# Patient Record
Sex: MALE | Race: WHITE | NOT HISPANIC OR LATINO
[De-identification: names, ages, dates, MRNs, and addresses within clinical notes are randomized per-mention and may not be internally consistent; named-entity substitution may affect disease eponyms.]

---

## 2018-02-16 ENCOUNTER — RESULT REVIEW (OUTPATIENT)
Age: 58
End: 2018-02-16

## 2019-05-20 ENCOUNTER — RECORD ABSTRACTING (OUTPATIENT)
Age: 59
End: 2019-05-20

## 2019-05-20 DIAGNOSIS — Z87.438 PERSONAL HISTORY OF OTHER DISEASES OF MALE GENITAL ORGANS: ICD-10-CM

## 2019-05-20 DIAGNOSIS — Z86.79 PERSONAL HISTORY OF OTHER DISEASES OF THE CIRCULATORY SYSTEM: ICD-10-CM

## 2019-05-20 DIAGNOSIS — Z12.11 ENCOUNTER FOR SCREENING FOR MALIGNANT NEOPLASM OF COLON: ICD-10-CM

## 2019-05-20 DIAGNOSIS — Z87.19 PERSONAL HISTORY OF OTHER DISEASES OF THE DIGESTIVE SYSTEM: ICD-10-CM

## 2019-08-13 ENCOUNTER — APPOINTMENT (OUTPATIENT)
Dept: FAMILY MEDICINE | Facility: CLINIC | Age: 59
End: 2019-08-13
Payer: MEDICAID

## 2019-08-13 VITALS
HEIGHT: 71 IN | BODY MASS INDEX: 34.44 KG/M2 | DIASTOLIC BLOOD PRESSURE: 80 MMHG | HEART RATE: 70 BPM | OXYGEN SATURATION: 97 % | WEIGHT: 246 LBS | TEMPERATURE: 98.3 F | SYSTOLIC BLOOD PRESSURE: 128 MMHG

## 2019-08-13 PROCEDURE — 99213 OFFICE O/P EST LOW 20 MIN: CPT | Mod: 25

## 2019-08-13 PROCEDURE — 36415 COLL VENOUS BLD VENIPUNCTURE: CPT

## 2019-08-13 NOTE — ASSESSMENT
[FreeTextEntry1] : Hypertension hyperlipidemia diabetes type 2, saw cardiology in May 2019, echocardiogram was done, nuclear stress test is scheduled for November 2019 with Dr. BEST

## 2019-08-13 NOTE — HISTORY OF PRESENT ILLNESS
[FreeTextEntry1] : pt is here for med refills and possible b/w  [de-identified] : A 59-year-old male history of diabetes type 2, hypertension and hyperlipidemia, here for his three-month check

## 2019-08-13 NOTE — PHYSICAL EXAM
[Well Nourished] : well nourished [No Acute Distress] : no acute distress [Well-Appearing] : well-appearing [Well Developed] : well developed [Normal Sclera/Conjunctiva] : normal sclera/conjunctiva [PERRL] : pupils equal round and reactive to light [EOMI] : extraocular movements intact [Normal Oropharynx] : the oropharynx was normal [Normal Outer Ear/Nose] : the outer ears and nose were normal in appearance [No JVD] : no jugular venous distention [No Lymphadenopathy] : no lymphadenopathy [Thyroid Normal, No Nodules] : the thyroid was normal and there were no nodules present [Supple] : supple [No Accessory Muscle Use] : no accessory muscle use [No Respiratory Distress] : no respiratory distress  [Clear to Auscultation] : lungs were clear to auscultation bilaterally [Normal Rate] : normal rate  [Regular Rhythm] : with a regular rhythm [Normal S1, S2] : normal S1 and S2 [No Murmur] : no murmur heard [No Abdominal Bruit] : a ~M bruit was not heard ~T in the abdomen [No Carotid Bruits] : no carotid bruits [No Varicosities] : no varicosities [Pedal Pulses Present] : the pedal pulses are present [No Edema] : there was no peripheral edema [No Extremity Clubbing/Cyanosis] : no extremity clubbing/cyanosis [No Palpable Aorta] : no palpable aorta [Soft] : abdomen soft [Non Tender] : non-tender [Non-distended] : non-distended [No Masses] : no abdominal mass palpated [No HSM] : no HSM [Normal Posterior Cervical Nodes] : no posterior cervical lymphadenopathy [Normal Bowel Sounds] : normal bowel sounds [Normal Anterior Cervical Nodes] : no anterior cervical lymphadenopathy [No CVA Tenderness] : no CVA  tenderness [No Spinal Tenderness] : no spinal tenderness [No Joint Swelling] : no joint swelling [Grossly Normal Strength/Tone] : grossly normal strength/tone [No Rash] : no rash [No Focal Deficits] : no focal deficits [Coordination Grossly Intact] : coordination grossly intact [Normal Gait] : normal gait [Deep Tendon Reflexes (DTR)] : deep tendon reflexes were 2+ and symmetric [Normal Affect] : the affect was normal [Normal Insight/Judgement] : insight and judgment were intact

## 2019-08-16 LAB
ALBUMIN SERPL ELPH-MCNC: 4.5 G/DL
ALP BLD-CCNC: 84 U/L
ALT SERPL-CCNC: 36 U/L
ANION GAP SERPL CALC-SCNC: 15 MMOL/L
AST SERPL-CCNC: 18 U/L
BILIRUB DIRECT SERPL-MCNC: 0.2 MG/DL
BILIRUB INDIRECT SERPL-MCNC: 0.3 MG/DL
BILIRUB SERPL-MCNC: 0.4 MG/DL
BUN SERPL-MCNC: 13 MG/DL
CALCIUM SERPL-MCNC: 9.5 MG/DL
CHLORIDE SERPL-SCNC: 103 MMOL/L
CHOLEST SERPL-MCNC: 149 MG/DL
CHOLEST/HDLC SERPL: 3.7 RATIO
CO2 SERPL-SCNC: 24 MMOL/L
CREAT SERPL-MCNC: 0.81 MG/DL
ESTIMATED AVERAGE GLUCOSE: 174 MG/DL
GLUCOSE SERPL-MCNC: 127 MG/DL
HBA1C MFR BLD HPLC: 7.7 %
HDLC SERPL-MCNC: 40 MG/DL
LDLC SERPL CALC-MCNC: 86 MG/DL
POTASSIUM SERPL-SCNC: 3.9 MMOL/L
PROT SERPL-MCNC: 6.5 G/DL
SODIUM SERPL-SCNC: 142 MMOL/L
TRIGL SERPL-MCNC: 114 MG/DL

## 2019-11-11 ENCOUNTER — RESULT CHARGE (OUTPATIENT)
Age: 59
End: 2019-11-11

## 2019-11-12 ENCOUNTER — NON-APPOINTMENT (OUTPATIENT)
Age: 59
End: 2019-11-12

## 2019-11-12 ENCOUNTER — APPOINTMENT (OUTPATIENT)
Dept: FAMILY MEDICINE | Facility: CLINIC | Age: 59
End: 2019-11-12
Payer: MEDICAID

## 2019-11-12 ENCOUNTER — MED ADMIN CHARGE (OUTPATIENT)
Age: 59
End: 2019-11-12

## 2019-11-12 VITALS
SYSTOLIC BLOOD PRESSURE: 124 MMHG | OXYGEN SATURATION: 97 % | TEMPERATURE: 98.4 F | BODY MASS INDEX: 33.93 KG/M2 | DIASTOLIC BLOOD PRESSURE: 80 MMHG | WEIGHT: 242.38 LBS | HEIGHT: 71 IN | HEART RATE: 70 BPM

## 2019-11-12 DIAGNOSIS — Z87.891 PERSONAL HISTORY OF NICOTINE DEPENDENCE: ICD-10-CM

## 2019-11-12 DIAGNOSIS — N52.9 MALE ERECTILE DYSFUNCTION, UNSPECIFIED: ICD-10-CM

## 2019-11-12 DIAGNOSIS — K43.9 VENTRAL HERNIA W/OUT OBSTRUCTION OR GANGRENE: ICD-10-CM

## 2019-11-12 LAB
BILIRUB UR QL STRIP: NORMAL
CLARITY UR: CLEAR
COLLECTION METHOD: NORMAL
GLUCOSE UR-MCNC: 500
HCG UR QL: 0.2 EU/DL
HGB UR QL STRIP.AUTO: NORMAL
KETONES UR-MCNC: NORMAL
LEUKOCYTE ESTERASE UR QL STRIP: NORMAL
NITRITE UR QL STRIP: NORMAL
PH UR STRIP: 5
PROT UR STRIP-MCNC: NORMAL
SP GR UR STRIP: 1.01

## 2019-11-12 PROCEDURE — 90686 IIV4 VACC NO PRSV 0.5 ML IM: CPT

## 2019-11-12 PROCEDURE — 36415 COLL VENOUS BLD VENIPUNCTURE: CPT

## 2019-11-12 PROCEDURE — 99396 PREV VISIT EST AGE 40-64: CPT | Mod: 25

## 2019-11-12 PROCEDURE — 90471 IMMUNIZATION ADMIN: CPT

## 2019-11-12 PROCEDURE — 93000 ELECTROCARDIOGRAM COMPLETE: CPT

## 2019-11-12 PROCEDURE — 81003 URINALYSIS AUTO W/O SCOPE: CPT | Mod: QW

## 2019-11-12 RX ORDER — VARENICLINE TARTRATE 1 MG/1
1 TABLET, FILM COATED ORAL
Refills: 0 | Status: DISCONTINUED | COMMUNITY
End: 2019-11-12

## 2019-11-12 NOTE — HEALTH RISK ASSESSMENT
[11-15] : 11-15 [No] : No [Patient reported colonoscopy was normal] : Patient reported colonoscopy was normal [With Family] : lives with family [Employed] : employed [College] : College [] :  [Sexually Active] : sexually active [] : No [YearQuit] : 1993 [Change in mental status noted] : No change in mental status noted [High Risk Behavior] : no high risk behavior [Reports changes in hearing] : Reports no changes in hearing [Reports changes in vision] : Reports no changes in vision [ColonoscopyDate] : 10/17 [Reports changes in dental health] : Reports no changes in dental health [HepatitisCDate] : 09/18 [HepatitisCComments] : negative

## 2019-11-12 NOTE — HISTORY OF PRESENT ILLNESS
[de-identified] : this is  60 y/o male with PMH htn, hlp, DM2, insulin dependent, reports feeling well with no complaints. here for wellness check , flu vaccine, and medication renewal. PMH also includes neuropathy in b/l feet, and right foot neuroma.

## 2019-11-12 NOTE — HEALTH RISK ASSESSMENT
[No] : No [11-15] : 11-15 [Patient reported colonoscopy was normal] : Patient reported colonoscopy was normal [With Family] : lives with family [Employed] : employed [College] : College [] :  [Sexually Active] : sexually active [] : No [YearQuit] : 1993 [Change in mental status noted] : No change in mental status noted [Reports changes in hearing] : Reports no changes in hearing [High Risk Behavior] : no high risk behavior [Reports changes in vision] : Reports no changes in vision [Reports changes in dental health] : Reports no changes in dental health [ColonoscopyDate] : 10/17 [HepatitisCDate] : 09/18 [HepatitisCComments] : negative

## 2019-11-12 NOTE — PHYSICAL EXAM
[Normal Sclera/Conjunctiva] : normal sclera/conjunctiva [Normal Outer Ear/Nose] : the outer ears and nose were normal in appearance [No Lymphadenopathy] : no lymphadenopathy [Supple] : supple [No Extremity Clubbing/Cyanosis] : no extremity clubbing/cyanosis [No Edema] : there was no peripheral edema [Soft] : abdomen soft [Non-distended] : non-distended [Normal Supraclavicular Nodes] : no supraclavicular lymphadenopathy [Normal Posterior Cervical Nodes] : no posterior cervical lymphadenopathy [Normal Anterior Cervical Nodes] : no anterior cervical lymphadenopathy [No Spinal Tenderness] : no spinal tenderness [No Skin Lesions] : no skin lesions [Normal] : affect was normal and insight and judgment were intact [No Focal Deficits] : no focal deficits [Acne] : no acne [de-identified] : ventral hernia, tenderness felt on palpation at xiphoid process.

## 2019-11-12 NOTE — HISTORY OF PRESENT ILLNESS
[de-identified] : this is  60 y/o male with PMH htn, hlp, DM2, insulin dependent, reports feeling well with no complaints. here for wellness check , flu vaccine, and medication renewal. PMH also includes neuropathy in b/l feet, and right foot neuroma.

## 2019-11-12 NOTE — PHYSICAL EXAM
[Normal Sclera/Conjunctiva] : normal sclera/conjunctiva [No Lymphadenopathy] : no lymphadenopathy [Normal Outer Ear/Nose] : the outer ears and nose were normal in appearance [Supple] : supple [No Extremity Clubbing/Cyanosis] : no extremity clubbing/cyanosis [No Edema] : there was no peripheral edema [Soft] : abdomen soft [Non-distended] : non-distended [Normal Posterior Cervical Nodes] : no posterior cervical lymphadenopathy [Normal Supraclavicular Nodes] : no supraclavicular lymphadenopathy [Normal Anterior Cervical Nodes] : no anterior cervical lymphadenopathy [No Spinal Tenderness] : no spinal tenderness [No Skin Lesions] : no skin lesions [Normal] : affect was normal and insight and judgment were intact [No Focal Deficits] : no focal deficits [Acne] : no acne [de-identified] : ventral hernia, tenderness felt on palpation at xiphoid process.

## 2019-11-21 LAB
25(OH)D3 SERPL-MCNC: 29.4 NG/ML
ALBUMIN SERPL ELPH-MCNC: 4.9 G/DL
ALP BLD-CCNC: 94 U/L
ALT SERPL-CCNC: 34 U/L
ANION GAP SERPL CALC-SCNC: 16 MMOL/L
APPEARANCE: CLEAR
AST SERPL-CCNC: 21 U/L
BACTERIA: NEGATIVE
BASOPHILS # BLD AUTO: 0.08 K/UL
BASOPHILS NFR BLD AUTO: 0.9 %
BILIRUB SERPL-MCNC: 0.5 MG/DL
BILIRUBIN URINE: NEGATIVE
BLOOD URINE: NEGATIVE
BUN SERPL-MCNC: 10 MG/DL
CALCIUM SERPL-MCNC: 9.8 MG/DL
CHLORIDE SERPL-SCNC: 102 MMOL/L
CHOLEST SERPL-MCNC: 162 MG/DL
CHOLEST/HDLC SERPL: 3.6 RATIO
CO2 SERPL-SCNC: 23 MMOL/L
COLOR: NORMAL
CREAT SERPL-MCNC: 0.86 MG/DL
EOSINOPHIL # BLD AUTO: 0.24 K/UL
EOSINOPHIL NFR BLD AUTO: 2.7 %
ESTIMATED AVERAGE GLUCOSE: 151 MG/DL
FOLATE SERPL-MCNC: 16 NG/ML
GLUCOSE QUALITATIVE U: ABNORMAL
GLUCOSE SERPL-MCNC: 135 MG/DL
HBA1C MFR BLD HPLC: 6.9 %
HCT VFR BLD CALC: 50.5 %
HDLC SERPL-MCNC: 45 MG/DL
HGB BLD-MCNC: 16.7 G/DL
HYALINE CASTS: 0 /LPF
IMM GRANULOCYTES NFR BLD AUTO: 1 %
IRON SATN MFR SERPL: 28 %
IRON SERPL-MCNC: 97 UG/DL
KETONES URINE: NEGATIVE
LDLC SERPL CALC-MCNC: 88 MG/DL
LEUKOCYTE ESTERASE URINE: NEGATIVE
LYMPHOCYTES # BLD AUTO: 1.9 K/UL
LYMPHOCYTES NFR BLD AUTO: 21.4 %
MAN DIFF?: NORMAL
MCHC RBC-ENTMCNC: 30.5 PG
MCHC RBC-ENTMCNC: 33.1 GM/DL
MCV RBC AUTO: 92.3 FL
MICROSCOPIC-UA: NORMAL
MONOCYTES # BLD AUTO: 0.87 K/UL
MONOCYTES NFR BLD AUTO: 9.8 %
NEUTROPHILS # BLD AUTO: 5.71 K/UL
NEUTROPHILS NFR BLD AUTO: 64.2 %
NITRITE URINE: NEGATIVE
PH URINE: 5
PLATELET # BLD AUTO: 388 K/UL
POTASSIUM SERPL-SCNC: 4.5 MMOL/L
PROT SERPL-MCNC: 7.1 G/DL
PROTEIN URINE: NEGATIVE
PSA SERPL-MCNC: 6.39 NG/ML
RBC # BLD: 5.47 M/UL
RBC # FLD: 12.7 %
RED BLOOD CELLS URINE: 1 /HPF
SODIUM SERPL-SCNC: 141 MMOL/L
SPECIFIC GRAVITY URINE: 1.03
SQUAMOUS EPITHELIAL CELLS: 0 /HPF
T4 FREE SERPL-MCNC: 1.2 NG/DL
TIBC SERPL-MCNC: 346 UG/DL
TRIGL SERPL-MCNC: 143 MG/DL
TSH SERPL-ACNC: 1.38 UIU/ML
UIBC SERPL-MCNC: 249 UG/DL
UROBILINOGEN URINE: NORMAL
VIT B12 SERPL-MCNC: 1074 PG/ML
WBC # FLD AUTO: 8.89 K/UL
WHITE BLOOD CELLS URINE: 0 /HPF

## 2020-02-11 ENCOUNTER — APPOINTMENT (OUTPATIENT)
Dept: FAMILY MEDICINE | Facility: CLINIC | Age: 60
End: 2020-02-11

## 2020-06-03 ENCOUNTER — RX RENEWAL (OUTPATIENT)
Age: 60
End: 2020-06-03

## 2020-08-18 ENCOUNTER — APPOINTMENT (OUTPATIENT)
Dept: FAMILY MEDICINE | Facility: CLINIC | Age: 60
End: 2020-08-18
Payer: MEDICAID

## 2020-08-18 VITALS
OXYGEN SATURATION: 96 % | WEIGHT: 238.8 LBS | SYSTOLIC BLOOD PRESSURE: 120 MMHG | BODY MASS INDEX: 33.31 KG/M2 | DIASTOLIC BLOOD PRESSURE: 72 MMHG | HEART RATE: 65 BPM | TEMPERATURE: 98.1 F

## 2020-08-18 PROCEDURE — 36415 COLL VENOUS BLD VENIPUNCTURE: CPT

## 2020-08-18 PROCEDURE — 99213 OFFICE O/P EST LOW 20 MIN: CPT | Mod: 25

## 2020-08-18 RX ORDER — SILDENAFIL 20 MG/1
20 TABLET ORAL
Qty: 60 | Refills: 3 | Status: DISCONTINUED | COMMUNITY
Start: 2019-11-12 | End: 2020-08-18

## 2020-08-18 NOTE — PLAN
[FreeTextEntry1] : patient will be prescribed Cialis 20 mg to use as needed, one hour before activit p.r.n. Patient understands that he is not to take sildenafil, and that is discontinue as of today

## 2020-08-18 NOTE — HISTORY OF PRESENT ILLNESS
[FreeTextEntry1] : pt. here for refill and b/w [de-identified] : 60-year-old male patient past medical history of diabetes mellitus, hypertension\par patient has a history of right Achilles tendon pain and tenderness time a year and a half. He saw an orthopedic, MRI was done, patient never followed up, and  never called him

## 2020-08-19 LAB
25(OH)D3 SERPL-MCNC: 57.3 NG/ML
ALBUMIN SERPL ELPH-MCNC: 5 G/DL
ALP BLD-CCNC: 102 U/L
ALT SERPL-CCNC: 29 U/L
ANION GAP SERPL CALC-SCNC: 15 MMOL/L
AST SERPL-CCNC: 22 U/L
BASOPHILS # BLD AUTO: 0.06 K/UL
BASOPHILS NFR BLD AUTO: 0.7 %
BILIRUB SERPL-MCNC: 0.4 MG/DL
BUN SERPL-MCNC: 14 MG/DL
CALCIUM SERPL-MCNC: 9.9 MG/DL
CHLORIDE SERPL-SCNC: 104 MMOL/L
CHOLEST SERPL-MCNC: 171 MG/DL
CHOLEST/HDLC SERPL: 3.7 RATIO
CO2 SERPL-SCNC: 23 MMOL/L
CREAT SERPL-MCNC: 0.91 MG/DL
EOSINOPHIL # BLD AUTO: 0.24 K/UL
EOSINOPHIL NFR BLD AUTO: 2.8 %
FOLATE SERPL-MCNC: 16.4 NG/ML
GLUCOSE SERPL-MCNC: 135 MG/DL
HCT VFR BLD CALC: 53 %
HDLC SERPL-MCNC: 46 MG/DL
HGB BLD-MCNC: 17 G/DL
IMM GRANULOCYTES NFR BLD AUTO: 0.3 %
IRON SATN MFR SERPL: 30 %
IRON SERPL-MCNC: 100 UG/DL
LDLC SERPL CALC-MCNC: 104 MG/DL
LYMPHOCYTES # BLD AUTO: 1.9 K/UL
LYMPHOCYTES NFR BLD AUTO: 22.1 %
MAN DIFF?: NORMAL
MCHC RBC-ENTMCNC: 31.1 PG
MCHC RBC-ENTMCNC: 32.1 GM/DL
MCV RBC AUTO: 97.1 FL
MONOCYTES # BLD AUTO: 0.76 K/UL
MONOCYTES NFR BLD AUTO: 8.8 %
NEUTROPHILS # BLD AUTO: 5.6 K/UL
NEUTROPHILS NFR BLD AUTO: 65.3 %
PLATELET # BLD AUTO: 344 K/UL
POTASSIUM SERPL-SCNC: 4.5 MMOL/L
PROT SERPL-MCNC: 6.8 G/DL
PSA SERPL-MCNC: 5.71 NG/ML
RBC # BLD: 5.46 M/UL
RBC # FLD: 13.5 %
SARS-COV-2 IGG SERPL IA-ACNC: 0.08 INDEX
SARS-COV-2 IGG SERPL QL IA: NEGATIVE
SODIUM SERPL-SCNC: 142 MMOL/L
T4 FREE SERPL-MCNC: 1.3 NG/DL
TIBC SERPL-MCNC: 333 UG/DL
TRIGL SERPL-MCNC: 106 MG/DL
TSH SERPL-ACNC: 1.27 UIU/ML
UIBC SERPL-MCNC: 233 UG/DL
VIT B12 SERPL-MCNC: >2000 PG/ML
WBC # FLD AUTO: 8.59 K/UL

## 2020-08-25 ENCOUNTER — APPOINTMENT (OUTPATIENT)
Dept: FAMILY MEDICINE | Facility: CLINIC | Age: 60
End: 2020-08-25
Payer: MEDICAID

## 2020-08-25 ENCOUNTER — NON-APPOINTMENT (OUTPATIENT)
Age: 60
End: 2020-08-25

## 2020-08-25 VITALS
HEART RATE: 72 BPM | OXYGEN SATURATION: 98 % | TEMPERATURE: 98.4 F | SYSTOLIC BLOOD PRESSURE: 120 MMHG | DIASTOLIC BLOOD PRESSURE: 70 MMHG | RESPIRATION RATE: 12 BRPM

## 2020-08-25 PROCEDURE — 99396 PREV VISIT EST AGE 40-64: CPT | Mod: 25

## 2020-08-25 PROCEDURE — 36415 COLL VENOUS BLD VENIPUNCTURE: CPT

## 2020-08-25 NOTE — HISTORY OF PRESENT ILLNESS
[FreeTextEntry1] : Pt is here for PE [de-identified] : 60-year-old male patient,here today for blood work results,patient was seen by podiatry and been treated for tendinitis of the Achilles tendon right foot.kaving a nuclear stress test with cardiology September 2020\par patient was at cardiology secondary to palpitations. Holter monitor did not catch the palpitations. He will followup. Hemoglobin A1c was not drawn last visit accidentally we will draw today

## 2020-08-25 NOTE — ASSESSMENT
[FreeTextEntry1] : 60-year-old male patientcontrolled diabetes mellitus2. Followup as needed or as scheduled

## 2020-08-25 NOTE — PHYSICAL EXAM
[Normal Sclera/Conjunctiva] : normal sclera/conjunctiva [Normal Outer Ear/Nose] : the outer ears and nose were normal in appearance [No Extremity Clubbing/Cyanosis] : no extremity clubbing/cyanosis [Normal] : affect was normal and insight and judgment were intact [No Rash] : no rash [No Lymphadenopathy] : no lymphadenopathy [Soft] : abdomen soft [Non Tender] : non-tender [Non-distended] : non-distended [de-identified] : positive reducible ventral hernia

## 2020-08-25 NOTE — HEALTH RISK ASSESSMENT
[Excellent] : ~his/her~  mood as  excellent [With Family] : lives with family [College] : College [] :  [Sexually Active] : sexually active [Change in mental status noted] : No change in mental status noted [High Risk Behavior] : no high risk behavior [Reports changes in vision] : Reports no changes in vision [ColonoscopyDate] : 01/2018 [Reports changes in hearing] : Reports no changes in hearing

## 2020-08-26 LAB
ESTIMATED AVERAGE GLUCOSE: 163 MG/DL
HBA1C MFR BLD HPLC: 7.3 %
TESTOST BND SERPL-MCNC: 12.6 PG/ML
TESTOST SERPL-MCNC: 219.2 NG/DL

## 2020-11-25 ENCOUNTER — TRANSCRIPTION ENCOUNTER (OUTPATIENT)
Age: 60
End: 2020-11-25

## 2021-01-13 ENCOUNTER — NON-APPOINTMENT (OUTPATIENT)
Age: 61
End: 2021-01-13

## 2021-01-13 ENCOUNTER — APPOINTMENT (OUTPATIENT)
Dept: FAMILY MEDICINE | Facility: CLINIC | Age: 61
End: 2021-01-13
Payer: MEDICAID

## 2021-01-13 VITALS
RESPIRATION RATE: 12 BRPM | BODY MASS INDEX: 32.92 KG/M2 | TEMPERATURE: 96.7 F | HEART RATE: 68 BPM | OXYGEN SATURATION: 98 % | WEIGHT: 236 LBS

## 2021-01-13 VITALS — DIASTOLIC BLOOD PRESSURE: 80 MMHG | SYSTOLIC BLOOD PRESSURE: 140 MMHG

## 2021-01-13 PROCEDURE — 99072 ADDL SUPL MATRL&STAF TM PHE: CPT

## 2021-01-13 PROCEDURE — 99213 OFFICE O/P EST LOW 20 MIN: CPT

## 2021-01-13 NOTE — ASSESSMENT
[FreeTextEntry1] : This entire dictation on this 61-year-old male who came in today for a little checkup and to refill his medications the patient feels well presently no complaints and just came in because his medications are rock expiring very shortly his pressure was 140/80 his heart had a regular rhythm S1-S2 lungs were clear neck was supple and the patient has a history of diabetes and hypertension all his medications were refilled and he was instructed to return to the office in 3 months for further refills

## 2021-01-19 ENCOUNTER — TRANSCRIPTION ENCOUNTER (OUTPATIENT)
Age: 61
End: 2021-01-19

## 2021-04-13 ENCOUNTER — APPOINTMENT (OUTPATIENT)
Dept: FAMILY MEDICINE | Facility: CLINIC | Age: 61
End: 2021-04-13
Payer: MEDICAID

## 2021-04-13 VITALS
DIASTOLIC BLOOD PRESSURE: 80 MMHG | SYSTOLIC BLOOD PRESSURE: 130 MMHG | BODY MASS INDEX: 33.64 KG/M2 | OXYGEN SATURATION: 96 % | RESPIRATION RATE: 14 BRPM | WEIGHT: 241.2 LBS | TEMPERATURE: 97.4 F | HEART RATE: 65 BPM

## 2021-04-13 PROCEDURE — 99213 OFFICE O/P EST LOW 20 MIN: CPT | Mod: 25

## 2021-04-13 PROCEDURE — 99072 ADDL SUPL MATRL&STAF TM PHE: CPT

## 2021-04-13 NOTE — PHYSICAL EXAM
[No Extremity Clubbing/Cyanosis] : no extremity clubbing/cyanosis [No Rash] : no rash [No Focal Deficits] : no focal deficits [Normal] : affect was normal and insight and judgment were intact [de-identified] : Decrease in abduction of his left shoulder, he also has tendernesswith external rotation.

## 2021-04-13 NOTE — HISTORY OF PRESENT ILLNESS
[FreeTextEntry1] : Pt is here for three months check up. [de-identified] : 61-year-old male patient here for his three-month check and renewal of medication recently received an echocardiogram which was within normal limits He will be following up with cardiology he will have an a computer and a carotid artery Doppler done. And in the fall he will receive a stress test.Past medical history of hypertension, diabetes mellitus type 2, hyperlipidemia, E.D\par He will be following up with his podiatrist secondary to an Achilles tendonosis.  Also has complaints of chronic left shoulder pain times years recently causing him tenderness, unable to lift his arm without pain\par patient has a history as a child of falling asleep on his elbows, injuring his shoulder is his left shoulder greater than right. And he's been suffering with shoulder pain on and off chronically\par Being followed by urology for his prostate,

## 2021-04-21 LAB
ALBUMIN SERPL ELPH-MCNC: 4.9 G/DL
ALP BLD-CCNC: 95 U/L
ALT SERPL-CCNC: 31 U/L
ANION GAP SERPL CALC-SCNC: 14 MMOL/L
AST SERPL-CCNC: 19 U/L
BILIRUB DIRECT SERPL-MCNC: 0.2 MG/DL
BILIRUB INDIRECT SERPL-MCNC: 0.3 MG/DL
BILIRUB SERPL-MCNC: 0.4 MG/DL
BUN SERPL-MCNC: 15 MG/DL
CALCIUM SERPL-MCNC: 10.3 MG/DL
CHLORIDE SERPL-SCNC: 102 MMOL/L
CHOLEST SERPL-MCNC: 157 MG/DL
CO2 SERPL-SCNC: 25 MMOL/L
COVID-19 NUCLEOCAPSID  GAM ANTIBODY INTERPRETATION: POSITIVE
CREAT SERPL-MCNC: 0.83 MG/DL
ESTIMATED AVERAGE GLUCOSE: 183 MG/DL
GLUCOSE SERPL-MCNC: 197 MG/DL
HBA1C MFR BLD HPLC: 8 %
HDLC SERPL-MCNC: 44 MG/DL
LDLC SERPL CALC-MCNC: 83 MG/DL
NONHDLC SERPL-MCNC: 114 MG/DL
POTASSIUM SERPL-SCNC: 4.2 MMOL/L
PROT SERPL-MCNC: 7.1 G/DL
SARS-COV-2 AB SERPL QL IA: 41.4 INDEX
SODIUM SERPL-SCNC: 140 MMOL/L
TRIGL SERPL-MCNC: 153 MG/DL

## 2021-04-27 ENCOUNTER — APPOINTMENT (OUTPATIENT)
Dept: FAMILY MEDICINE | Facility: CLINIC | Age: 61
End: 2021-04-27
Payer: MEDICAID

## 2021-04-28 ENCOUNTER — APPOINTMENT (OUTPATIENT)
Dept: FAMILY MEDICINE | Facility: CLINIC | Age: 61
End: 2021-04-28
Payer: MEDICAID

## 2021-04-28 VITALS
TEMPERATURE: 96.5 F | OXYGEN SATURATION: 98 % | HEART RATE: 76 BPM | RESPIRATION RATE: 14 BRPM | DIASTOLIC BLOOD PRESSURE: 80 MMHG | SYSTOLIC BLOOD PRESSURE: 123 MMHG

## 2021-04-28 PROCEDURE — 99213 OFFICE O/P EST LOW 20 MIN: CPT | Mod: 25

## 2021-04-28 PROCEDURE — 99072 ADDL SUPL MATRL&STAF TM PHE: CPT

## 2021-04-28 RX ORDER — SYRINGE AND NEEDLE,INSULIN,1ML 30 G X1/2"
30G X 1/2" SYRINGE, EMPTY DISPOSABLE MISCELLANEOUS
Qty: 30 | Refills: 0 | Status: DISCONTINUED | COMMUNITY
Start: 2020-10-19

## 2021-04-28 RX ORDER — NEOMYCIN AND POLYMYXIN B SULFATES AND HYDROCORTISONE OTIC 10; 3.5; 1 MG/ML; MG/ML; [USP'U]/ML
3.5-10000-1 SUSPENSION AURICULAR (OTIC)
Qty: 10 | Refills: 0 | Status: DISCONTINUED | COMMUNITY
Start: 2020-11-25

## 2021-04-28 NOTE — PHYSICAL EXAM
[No Extremity Clubbing/Cyanosis] : no extremity clubbing/cyanosis [No Rash] : no rash [No Focal Deficits] : no focal deficits [Normal] : affect was normal and insight and judgment were intact [Normal Sclera/Conjunctiva] : normal sclera/conjunctiva [Normal Outer Ear/Nose] : the outer ears and nose were normal in appearance [de-identified] : Decrease in abduction of his left shoulder, he also has tendernesswith external rotation.

## 2021-04-28 NOTE — PLAN
[FreeTextEntry1] : 61-year-old male patient with diabetes type 2. We will increase his medication, patient is aware it doesn't agree with him he will let us know. Watch his sugars, carbohydrates, needs lifestyle changes\par \par Patient was recommended to return to office in one month, blood sugar numbers, first morning, 2 hours after dinner. Patient is aware and will comply\par

## 2021-04-28 NOTE — HISTORY OF PRESENT ILLNESS
[FreeTextEntry1] : Pt is here for follow up. [de-identified] : 61-year-old male patient who is here for left shoulder pain followup. Patient states he has not been watching his diet closely, that's why his A1c is elevated. Conversation today with the patient, he states he will start watching his diet and start Jardience 25 mg daily.\par Patient did not go to physical therapy as of yet, and did not get his x-ray. He will  his prescriptions today and get them done. He's also here secondary to him following up with urology, needs a PSA\par Patient has appointment this Saturday for his first vaccination, for covid 19  Pf[employer]

## 2021-05-13 LAB
PSA FREE FLD-MCNC: 15 %
PSA FREE SERPL-MCNC: 0.69 NG/ML
PSA SERPL-MCNC: 4.66 NG/ML

## 2021-05-26 ENCOUNTER — RX RENEWAL (OUTPATIENT)
Age: 61
End: 2021-05-26

## 2021-06-09 ENCOUNTER — APPOINTMENT (OUTPATIENT)
Dept: FAMILY MEDICINE | Facility: CLINIC | Age: 61
End: 2021-06-09

## 2021-06-30 ENCOUNTER — APPOINTMENT (OUTPATIENT)
Dept: FAMILY MEDICINE | Facility: CLINIC | Age: 61
End: 2021-06-30
Payer: MEDICAID

## 2021-06-30 VITALS — TEMPERATURE: 97.6 F | OXYGEN SATURATION: 97 % | HEART RATE: 64 BPM | RESPIRATION RATE: 12 BRPM

## 2021-06-30 VITALS — DIASTOLIC BLOOD PRESSURE: 80 MMHG | SYSTOLIC BLOOD PRESSURE: 122 MMHG

## 2021-06-30 LAB
BILIRUB UR QL STRIP: NORMAL
CLARITY UR: CLEAR
COLLECTION METHOD: NORMAL
GLUCOSE UR-MCNC: 500
HCG UR QL: 0.2 EU/DL
HGB UR QL STRIP.AUTO: NORMAL
KETONES UR-MCNC: NORMAL
LEUKOCYTE ESTERASE UR QL STRIP: NORMAL
NITRITE UR QL STRIP: NORMAL
PH UR STRIP: 5.5
PROT UR STRIP-MCNC: NORMAL
SP GR UR STRIP: 1.02

## 2021-06-30 PROCEDURE — 99213 OFFICE O/P EST LOW 20 MIN: CPT | Mod: 25

## 2021-06-30 PROCEDURE — 81003 URINALYSIS AUTO W/O SCOPE: CPT | Mod: QW

## 2021-07-02 LAB
ALBUMIN SERPL ELPH-MCNC: 4.9 G/DL
ALP BLD-CCNC: 90 U/L
ALT SERPL-CCNC: 33 U/L
ANION GAP SERPL CALC-SCNC: 15 MMOL/L
AST SERPL-CCNC: 20 U/L
BASOPHILS # BLD AUTO: 0.06 K/UL
BASOPHILS NFR BLD AUTO: 0.7 %
BILIRUB SERPL-MCNC: 0.5 MG/DL
BUN SERPL-MCNC: 15 MG/DL
CALCIUM SERPL-MCNC: 9.9 MG/DL
CHLORIDE SERPL-SCNC: 102 MMOL/L
CHOLEST SERPL-MCNC: 156 MG/DL
CO2 SERPL-SCNC: 22 MMOL/L
CREAT SERPL-MCNC: 0.79 MG/DL
EOSINOPHIL # BLD AUTO: 0.21 K/UL
EOSINOPHIL NFR BLD AUTO: 2.5 %
ESTIMATED AVERAGE GLUCOSE: 174 MG/DL
GLUCOSE SERPL-MCNC: 167 MG/DL
HBA1C MFR BLD HPLC: 7.7 %
HCT VFR BLD CALC: 51 %
HDLC SERPL-MCNC: 45 MG/DL
HGB BLD-MCNC: 17.1 G/DL
IMM GRANULOCYTES NFR BLD AUTO: 0.4 %
LDLC SERPL CALC-MCNC: 86 MG/DL
LYMPHOCYTES # BLD AUTO: 1.8 K/UL
LYMPHOCYTES NFR BLD AUTO: 21.6 %
MAN DIFF?: NORMAL
MCHC RBC-ENTMCNC: 31.3 PG
MCHC RBC-ENTMCNC: 33.5 GM/DL
MCV RBC AUTO: 93.4 FL
MONOCYTES # BLD AUTO: 0.75 K/UL
MONOCYTES NFR BLD AUTO: 9 %
NEUTROPHILS # BLD AUTO: 5.47 K/UL
NEUTROPHILS NFR BLD AUTO: 65.8 %
NONHDLC SERPL-MCNC: 112 MG/DL
PLATELET # BLD AUTO: 334 K/UL
POTASSIUM SERPL-SCNC: 4.1 MMOL/L
PROT SERPL-MCNC: 7 G/DL
PSA FREE FLD-MCNC: 14 %
PSA FREE SERPL-MCNC: 0.86 NG/ML
PSA SERPL-MCNC: 6.03 NG/ML
RBC # BLD: 5.46 M/UL
RBC # FLD: 13 %
SODIUM SERPL-SCNC: 139 MMOL/L
T4 FREE SERPL-MCNC: 1.3 NG/DL
TRIGL SERPL-MCNC: 128 MG/DL
TSH SERPL-ACNC: 1.12 UIU/ML
WBC # FLD AUTO: 8.32 K/UL

## 2021-07-03 ENCOUNTER — NON-APPOINTMENT (OUTPATIENT)
Age: 61
End: 2021-07-03

## 2021-09-22 ENCOUNTER — RX RENEWAL (OUTPATIENT)
Age: 61
End: 2021-09-22

## 2021-09-29 ENCOUNTER — APPOINTMENT (OUTPATIENT)
Dept: FAMILY MEDICINE | Facility: CLINIC | Age: 61
End: 2021-09-29
Payer: MEDICAID

## 2021-09-29 VITALS
BODY MASS INDEX: 33.05 KG/M2 | WEIGHT: 237 LBS | OXYGEN SATURATION: 97 % | RESPIRATION RATE: 14 BRPM | HEART RATE: 68 BPM | TEMPERATURE: 97.8 F

## 2021-09-29 VITALS — DIASTOLIC BLOOD PRESSURE: 80 MMHG | SYSTOLIC BLOOD PRESSURE: 130 MMHG

## 2021-09-29 PROCEDURE — 99214 OFFICE O/P EST MOD 30 MIN: CPT

## 2021-09-29 NOTE — ASSESSMENT
[FreeTextEntry1] : This amount of dictation for this 61-year-old male who came in today for refills and will check a he feels very very well no complaints his pressure was 130/80 his heart had a regular rhythm S1-S2 lungs were clear as mentioned he feels very very well working outdoors not having any problems no shortness of breath etc. I renewed his prescriptions today he is up-to-date with the culprit vaccine and some fracture will be due for his booster which he wants in November his prescriptions were refilled and he will return to the office again in approximately 3 months

## 2021-10-25 ENCOUNTER — NON-APPOINTMENT (OUTPATIENT)
Age: 61
End: 2021-10-25

## 2021-12-07 ENCOUNTER — APPOINTMENT (OUTPATIENT)
Dept: FAMILY MEDICINE | Facility: CLINIC | Age: 61
End: 2021-12-07
Payer: MEDICAID

## 2021-12-07 VITALS — SYSTOLIC BLOOD PRESSURE: 135 MMHG | DIASTOLIC BLOOD PRESSURE: 90 MMHG

## 2021-12-07 VITALS — OXYGEN SATURATION: 97 % | HEART RATE: 65 BPM | TEMPERATURE: 97.7 F | RESPIRATION RATE: 14 BRPM

## 2021-12-07 PROCEDURE — 99214 OFFICE O/P EST MOD 30 MIN: CPT

## 2021-12-07 NOTE — ASSESSMENT
[FreeTextEntry1] : As an out of dictation is a 61-year-old male who came in he was skiing several days ago and he developed severe pain of his right elbow swelling so bad that he was able to drive home son had to drive he went home he asked took Advil he came in today to see what was going on he really had very very minimal swelling not enough he was aspirated and he felt much much better I expect him most probably he had an injury and he had inflammatory response causes swelling and has Y. his range of motion was drastically reduced because of the swelling at this point I explained to him that aspirating it would be very unlikely to make any significant difference in just to keep up the PICU he was taking 2-3 Advil at a time I told him I rather he takes Celebrex which she has taken in the past very successful he does take one 200 mg Celebrex his pressure was 135/90 and he is due for a blood pressure reevaluation here in several weeks so I will see the patient at that time and I explained to him just to take it easy for a while the low swelling is totally gone

## 2021-12-22 ENCOUNTER — APPOINTMENT (OUTPATIENT)
Dept: FAMILY MEDICINE | Facility: CLINIC | Age: 61
End: 2021-12-22
Payer: MEDICAID

## 2021-12-22 ENCOUNTER — LABORATORY RESULT (OUTPATIENT)
Age: 61
End: 2021-12-22

## 2021-12-22 VITALS
WEIGHT: 234 LBS | TEMPERATURE: 97.5 F | HEART RATE: 71 BPM | OXYGEN SATURATION: 97 % | RESPIRATION RATE: 14 BRPM | BODY MASS INDEX: 32.64 KG/M2

## 2021-12-22 VITALS — SYSTOLIC BLOOD PRESSURE: 140 MMHG | DIASTOLIC BLOOD PRESSURE: 90 MMHG

## 2021-12-22 LAB
BILIRUB UR QL STRIP: NORMAL
CLARITY UR: CLEAR
COLLECTION METHOD: NORMAL
GLUCOSE UR-MCNC: NORMAL
HCG UR QL: 0.2 EU/DL
HGB UR QL STRIP.AUTO: NORMAL
KETONES UR-MCNC: NORMAL
LEUKOCYTE ESTERASE UR QL STRIP: NORMAL
NITRITE UR QL STRIP: NORMAL
PH UR STRIP: 5.5
PROT UR STRIP-MCNC: NORMAL
SP GR UR STRIP: 1.02

## 2021-12-22 PROCEDURE — 81003 URINALYSIS AUTO W/O SCOPE: CPT | Mod: QW

## 2021-12-22 PROCEDURE — 99214 OFFICE O/P EST MOD 30 MIN: CPT | Mod: 25

## 2021-12-22 NOTE — ASSESSMENT
[FreeTextEntry1] : It is of note of dictation for this 61-year-old male who came into for followup of his right elbow pain the pain is nearly 100% resolved as a matter of fact the patient even went skiing this past weekend which I mentioned against my advice however he says he feels very well and is going skiing again this weekend and is going to [place] to do more CI told he should be more careful but that's all I can say his pressure was 140/90 he does know he says his pressure has been lower at home I tell him I can I go by like to find his cardiologist had cut back in a limited in [facility] so he did other refills for his diabetes and his cholesterol all the other medications that he needed refills and he returned to the office again when he needs additional refills

## 2022-01-03 LAB
ALBUMIN SERPL ELPH-MCNC: 5 G/DL
ALP BLD-CCNC: 95 U/L
ALT SERPL-CCNC: 31 U/L
ANION GAP SERPL CALC-SCNC: 17 MMOL/L
AST SERPL-CCNC: 19 U/L
BASOPHILS # BLD AUTO: 0.07 K/UL
BASOPHILS NFR BLD AUTO: 0.7 %
BILIRUB SERPL-MCNC: 0.6 MG/DL
BUN SERPL-MCNC: 14 MG/DL
CALCIUM SERPL-MCNC: 9.9 MG/DL
CHLORIDE SERPL-SCNC: 105 MMOL/L
CHOLEST SERPL-MCNC: 178 MG/DL
CO2 SERPL-SCNC: 20 MMOL/L
CREAT SERPL-MCNC: 0.8 MG/DL
EOSINOPHIL # BLD AUTO: 0.22 K/UL
EOSINOPHIL NFR BLD AUTO: 2.1 %
ESTIMATED AVERAGE GLUCOSE: 163 MG/DL
GLUCOSE SERPL-MCNC: 139 MG/DL
HBA1C MFR BLD HPLC: 7.3 %
HCT VFR BLD CALC: 51.7 %
HDLC SERPL-MCNC: 46 MG/DL
HGB BLD-MCNC: 17.5 G/DL
IMM GRANULOCYTES NFR BLD AUTO: 0.3 %
LDLC SERPL CALC-MCNC: 98 MG/DL
LYMPHOCYTES # BLD AUTO: 1.69 K/UL
LYMPHOCYTES NFR BLD AUTO: 16.1 %
MAN DIFF?: NORMAL
MCHC RBC-ENTMCNC: 31.3 PG
MCHC RBC-ENTMCNC: 33.8 GM/DL
MCV RBC AUTO: 92.3 FL
MONOCYTES # BLD AUTO: 1.01 K/UL
MONOCYTES NFR BLD AUTO: 9.6 %
NEUTROPHILS # BLD AUTO: 7.49 K/UL
NEUTROPHILS NFR BLD AUTO: 71.2 %
NONHDLC SERPL-MCNC: 132 MG/DL
PLATELET # BLD AUTO: 335 K/UL
POTASSIUM SERPL-SCNC: 4.2 MMOL/L
PROT SERPL-MCNC: 7.1 G/DL
PSA FREE FLD-MCNC: 15 %
PSA FREE SERPL-MCNC: 0.88 NG/ML
PSA SERPL-MCNC: 5.83 NG/ML
RBC # BLD: 5.6 M/UL
RBC # FLD: 13.1 %
SODIUM SERPL-SCNC: 141 MMOL/L
T4 FREE SERPL-MCNC: 1.3 NG/DL
TRIGL SERPL-MCNC: 169 MG/DL
TSH SERPL-ACNC: 1.68 UIU/ML
WBC # FLD AUTO: 10.51 K/UL

## 2022-03-02 ENCOUNTER — RX RENEWAL (OUTPATIENT)
Age: 62
End: 2022-03-02

## 2022-03-17 ENCOUNTER — APPOINTMENT (OUTPATIENT)
Dept: FAMILY MEDICINE | Facility: CLINIC | Age: 62
End: 2022-03-17
Payer: MEDICAID

## 2022-03-17 VITALS
DIASTOLIC BLOOD PRESSURE: 89 MMHG | TEMPERATURE: 97.2 F | BODY MASS INDEX: 33.18 KG/M2 | OXYGEN SATURATION: 99 % | HEART RATE: 72 BPM | SYSTOLIC BLOOD PRESSURE: 160 MMHG | WEIGHT: 237 LBS | HEIGHT: 71 IN | RESPIRATION RATE: 14 BRPM

## 2022-03-17 VITALS — SYSTOLIC BLOOD PRESSURE: 158 MMHG | DIASTOLIC BLOOD PRESSURE: 85 MMHG

## 2022-03-17 DIAGNOSIS — S89.90XA UNSPECIFIED INJURY OF UNSPECIFIED LOWER LEG, INITIAL ENCOUNTER: ICD-10-CM

## 2022-03-17 PROCEDURE — 99214 OFFICE O/P EST MOD 30 MIN: CPT

## 2022-03-17 RX ORDER — TADALAFIL 20 MG/1
20 TABLET ORAL
Qty: 90 | Refills: 0 | Status: DISCONTINUED | COMMUNITY
Start: 2020-08-18 | End: 2022-03-17

## 2022-03-17 NOTE — HISTORY OF PRESENT ILLNESS
[FreeTextEntry8] : Patient is C/O right knee pain and swollen\par \par 62y M w/ PMHx DM, HTN, HLD, hx elevated PSA, Afib on xarelto, presenting for R knee pain after skiing injury. \par \par he is walking now with a cane to avoid putting too much weight on it. he did notice the R knee become swollen. he was skiing. he had an acl repair of his l knee in 1995. no knee surgeries since then. he fell backwards and he felt like his knee twisted. the skiis stayed on. he felt a slight pop but he hears that often from his knee. he has some pain of his left hamstring bc he put some pressure on that knee as he fell. it feels tender especially in the medial R knee. no bruising or injuries elsewhere in his body. this happened yesterday morning. \par \par Afib: he has been taking xarelto 20mg daily since November.  also on baby aspirin. was started on by his cardiologist Dr. Rincon. his next appt with him is in end of april. \par \par also on celebrex for achilles tendinosis. \par \par HLD; atorva 10\par \par DM; jardiance 25, metformin 100 BID, trulicity .75 weekly\par \par HTN: valsartan- hctz 160-12.5, metoprolol 50.

## 2022-03-17 NOTE — ASSESSMENT
[FreeTextEntry1] : Physical Exam:\par Constitutional: No acute distress, well appearing\par HEENT: Normocephalic, atraumatic\par Neck: supple\par Cardiac: S1S2, Regular rate and rhythm, No murmurs\par Pulmonary: No respiratory distress, Lungs clear to auscultation bilaterally, no wheezing, rales, or rhonchi\par Abdomen: Soft, non-tender, non-distended, no guarding, normal bowel sounds\par Vascular: No peripheral edema\par Neurology: Coordination grossly intact, no focal deficits\par Psychiatric: Alert and oriented x3, normal mood\par has some swelling of his L knee on the lateral aspect. some slight TTP of the medial knee. knee is very slightly pink, minimal warmth\par able to walk but limping.\par \par \par A/P:\par Knee injury:\par has some swelling of his L knee on the lateral aspect. some slight TTP of the medial knee. knee is very slightly pink, minimal warmth\par able to walk but limping.\par - will get XR to r/o any acute events\par - recommended to apply ice to minimize swelling\par - recommended to take tylenol instead of advil or celecoxib since he is currently already on xarelto, contraindicated to be on both as there is a risk of bleed\par - referred to ortho fast track for further evaluation\par - recommended to be cautious and avoid hard impact sports especially while he is on blood thinners to avoid any future catastrophic events. \par \par \par Afib: \par - can c/w xarelto and baby aspirin as directed by his cardiologist.\par \par HLD; \par - c/w atorva 10\par \par DM;\par - regular follow up with PCP \par \par HTN: \par BP elevated today. however on chart review has been elevated last several visits.  he reports it being elevated at home as well and noticed an overall increasing trend. he is interested in adjusting his medication. \par reports being on amlodipine in the past which helped control his BP but was taken off by his cardiologist. \par - will increase valsartan- hctz to 320-12.5. c/w metoprolol 50. \par - recommended to f/u in 1 month for BP check

## 2022-03-18 ENCOUNTER — APPOINTMENT (OUTPATIENT)
Dept: ORTHOPEDIC SURGERY | Facility: CLINIC | Age: 62
End: 2022-03-18
Payer: MEDICAID

## 2022-03-18 VITALS — HEIGHT: 72 IN | WEIGHT: 235 LBS | BODY MASS INDEX: 31.83 KG/M2

## 2022-03-18 DIAGNOSIS — M25.461 EFFUSION, RIGHT KNEE: ICD-10-CM

## 2022-03-18 PROCEDURE — 99204 OFFICE O/P NEW MOD 45 MIN: CPT

## 2022-03-21 ENCOUNTER — APPOINTMENT (OUTPATIENT)
Dept: FAMILY MEDICINE | Facility: CLINIC | Age: 62
End: 2022-03-21
Payer: MEDICAID

## 2022-03-21 VITALS — SYSTOLIC BLOOD PRESSURE: 125 MMHG | DIASTOLIC BLOOD PRESSURE: 75 MMHG

## 2022-03-21 VITALS — HEART RATE: 64 BPM | OXYGEN SATURATION: 97 % | TEMPERATURE: 97.6 F

## 2022-03-21 PROCEDURE — 99214 OFFICE O/P EST MOD 30 MIN: CPT

## 2022-03-21 NOTE — ASSESSMENT
[FreeTextEntry1] : This amount of dictation for this 62-year-old male who came in today for refill of his prescriptions the patient feels essentially well other than the persistent injury he had to his right knee which is being followed by orthopedics the patient feels well his pressure was excellent today one 2575 his heart had a regular rhythm S1-S2 lungs are clear and his prescriptions were refilled he has a history of hypertension diabetes and hyperlipidemia

## 2022-03-24 ENCOUNTER — NON-APPOINTMENT (OUTPATIENT)
Age: 62
End: 2022-03-24

## 2022-04-14 ENCOUNTER — NON-APPOINTMENT (OUTPATIENT)
Age: 62
End: 2022-04-14

## 2022-04-15 ENCOUNTER — APPOINTMENT (OUTPATIENT)
Dept: ORTHOPEDIC SURGERY | Facility: CLINIC | Age: 62
End: 2022-04-15
Payer: MEDICAID

## 2022-04-15 PROCEDURE — 99214 OFFICE O/P EST MOD 30 MIN: CPT

## 2022-04-20 ENCOUNTER — APPOINTMENT (OUTPATIENT)
Dept: ORTHOPEDIC SURGERY | Facility: CLINIC | Age: 62
End: 2022-04-20
Payer: MEDICAID

## 2022-04-20 VITALS — WEIGHT: 232 LBS | BODY MASS INDEX: 28.85 KG/M2 | HEIGHT: 75 IN

## 2022-04-20 PROCEDURE — 99214 OFFICE O/P EST MOD 30 MIN: CPT

## 2022-04-20 NOTE — DISCUSSION/SUMMARY
[de-identified] : 62-year-old male added severe with right knee anterior cruciate ligament tear.  We discussed treatment options at length both surgical and nonsurgical.  We discussed nonsurgical treatment with activity modification and physical therapy.  We discussed bracing.  We discussed the risk of recurrent injury with damage to cartilage and meniscus.  We did discuss surgical options.  Right knee arthroscopy anterior cruciate ligament reconstruction.  Given his age and activity level I would recommend using patella tendon allograft.  He would like to have surgery done to restore stability to his knee to give him the highest chance of being able to ski next season.  Risks benefits alternatives were discussed in detail including but not limited to risks such as bleeding infection nerve and vessel injury continued pain stiffness retear need for future surgery including knee replacement medical complications such as DVT or PE and anesthesia complications.  All questions were answered and he will schedule at his convenience

## 2022-04-20 NOTE — PHYSICAL EXAM
[de-identified] : General Exam\par \par Well developed, well nourished\par No apparent distress\par Oriented to person, place, and time\par Mood: Normal\par Affect: Normal\par Balance and coordination: Normal\par Gait: Normal\par \par right knee exam\par \par Skin: Clean, dry, intact\par Inspection: No obvious malalignment, no masses, no swelling, no effusion.\par Tenderness: no MJLT. No LJLT. No tenderness over the medial and lateral patella facets. No ttp medial/lateral epicondyle, patella tendon, tibial tubercle, pes anserinus, or proximal fibula.\par ROM: 0 to 130° no pain with deep flexion in both knees\par Stability: Stable to varus, valgus, 2+ anterior drawer 2B Lachman\par Additional tests: Negative McMurrays test, Negative patellar grind test. \par Strength: 5/5 Q/H/TA/GS/EHL, no atrophy\par Neuro: In tact to light touch throughout in dp/sp/tib/victoriano/saph nerve districutions, DTR's normal\par Pulses: 2+ DP/PT pulses.\par  [de-identified] : Radiographs and MRI obtained previously were reviewed.  There is a full-thickness tear of the anterior cruciate ligament.  There are impaction fractures consistent with pivot shift injury.  There is a high-grade sprain of the medial collateral ligament.

## 2022-04-26 ENCOUNTER — NON-APPOINTMENT (OUTPATIENT)
Age: 62
End: 2022-04-26

## 2022-04-28 ENCOUNTER — APPOINTMENT (OUTPATIENT)
Dept: FAMILY MEDICINE | Facility: CLINIC | Age: 62
End: 2022-04-28
Payer: MEDICAID

## 2022-04-28 VITALS — RESPIRATION RATE: 14 BRPM | HEART RATE: 68 BPM | TEMPERATURE: 97.1 F | OXYGEN SATURATION: 98 %

## 2022-04-28 PROCEDURE — 99214 OFFICE O/P EST MOD 30 MIN: CPT

## 2022-04-29 ENCOUNTER — OUTPATIENT (OUTPATIENT)
Dept: OUTPATIENT SERVICES | Facility: HOSPITAL | Age: 62
LOS: 1 days | Discharge: HOME | End: 2022-04-29

## 2022-04-29 ENCOUNTER — TRANSCRIPTION ENCOUNTER (OUTPATIENT)
Age: 62
End: 2022-04-29

## 2022-04-29 VITALS
HEIGHT: 72 IN | RESPIRATION RATE: 18 BRPM | SYSTOLIC BLOOD PRESSURE: 121 MMHG | DIASTOLIC BLOOD PRESSURE: 78 MMHG | HEART RATE: 65 BPM | WEIGHT: 237 LBS | TEMPERATURE: 98 F | OXYGEN SATURATION: 97 %

## 2022-04-29 DIAGNOSIS — S83.511A SPRAIN OF ANTERIOR CRUCIATE LIGAMENT OF RIGHT KNEE, INITIAL ENCOUNTER: ICD-10-CM

## 2022-04-29 DIAGNOSIS — S61.012A LACERATION WITHOUT FOREIGN BODY OF LEFT THUMB WITHOUT DAMAGE TO NAIL, INITIAL ENCOUNTER: Chronic | ICD-10-CM

## 2022-04-29 DIAGNOSIS — Z01.818 ENCOUNTER FOR OTHER PREPROCEDURAL EXAMINATION: ICD-10-CM

## 2022-04-29 DIAGNOSIS — Z98.89 OTHER SPECIFIED POSTPROCEDURAL STATES: Chronic | ICD-10-CM

## 2022-04-29 DIAGNOSIS — Z98.890 OTHER SPECIFIED POSTPROCEDURAL STATES: Chronic | ICD-10-CM

## 2022-04-29 LAB
A1C WITH ESTIMATED AVERAGE GLUCOSE RESULT: 7.8 % — HIGH (ref 4–5.6)
ANION GAP SERPL CALC-SCNC: 6 MMOL/L — SIGNIFICANT CHANGE UP (ref 5–17)
BUN SERPL-MCNC: 12 MG/DL — SIGNIFICANT CHANGE UP (ref 7–23)
CALCIUM SERPL-MCNC: 9.2 MG/DL — SIGNIFICANT CHANGE UP (ref 8.5–10.1)
CHLORIDE SERPL-SCNC: 105 MMOL/L — SIGNIFICANT CHANGE UP (ref 96–108)
CO2 SERPL-SCNC: 31 MMOL/L — SIGNIFICANT CHANGE UP (ref 22–31)
CREAT SERPL-MCNC: 0.74 MG/DL — SIGNIFICANT CHANGE UP (ref 0.5–1.3)
EGFR: 104 ML/MIN/1.73M2 — SIGNIFICANT CHANGE UP
ESTIMATED AVERAGE GLUCOSE: 177 MG/DL — HIGH (ref 68–114)
GLUCOSE SERPL-MCNC: 131 MG/DL — HIGH (ref 70–99)
HCT VFR BLD CALC: 50 % — SIGNIFICANT CHANGE UP (ref 39–50)
HGB BLD-MCNC: 17.6 G/DL — HIGH (ref 13–17)
MCHC RBC-ENTMCNC: 31 PG — SIGNIFICANT CHANGE UP (ref 27–34)
MCHC RBC-ENTMCNC: 35.2 G/DL — SIGNIFICANT CHANGE UP (ref 32–36)
MCV RBC AUTO: 88 FL — SIGNIFICANT CHANGE UP (ref 80–100)
NRBC # BLD: 0 /100 WBCS — SIGNIFICANT CHANGE UP (ref 0–0)
PLATELET # BLD AUTO: 328 K/UL — SIGNIFICANT CHANGE UP (ref 150–400)
POTASSIUM SERPL-MCNC: 3.6 MMOL/L — SIGNIFICANT CHANGE UP (ref 3.5–5.3)
POTASSIUM SERPL-SCNC: 3.6 MMOL/L — SIGNIFICANT CHANGE UP (ref 3.5–5.3)
RBC # BLD: 5.68 M/UL — SIGNIFICANT CHANGE UP (ref 4.2–5.8)
RBC # FLD: 12.6 % — SIGNIFICANT CHANGE UP (ref 10.3–14.5)
SODIUM SERPL-SCNC: 142 MMOL/L — SIGNIFICANT CHANGE UP (ref 135–145)
WBC # BLD: 8.77 K/UL — SIGNIFICANT CHANGE UP (ref 3.8–10.5)
WBC # FLD AUTO: 8.77 K/UL — SIGNIFICANT CHANGE UP (ref 3.8–10.5)

## 2022-04-29 NOTE — H&P PST ADULT - NSICDXFAMILYHX_GEN_ALL_CORE_FT
FAMILY HISTORY:  Father  Still living? Yes, Estimated age: Age Unknown  Family history of diabetes mellitus, Age at diagnosis: Age Unknown  Family history of heart disease, Age at diagnosis: Age Unknown

## 2022-04-29 NOTE — H&P PST ADULT - HISTORY OF PRESENT ILLNESS
This is a 61 y/o male twisted right knee in 3/2022 , MRI revealed + rupture of ACL right knee, he presents today for  This is a 61 y/o male twisted right knee in 3/2022 , MRI revealed + rupture of ACL right knee, c/o knee pain at times,  he presents today for right knee arthroscopy ACL reconstruction 5/5/22  covid swab to be done 5/2 at [facility]  denies recent travel or covid infections

## 2022-04-29 NOTE — H&P PST ADULT - NSICDXPASTSURGICALHX_GEN_ALL_CORE_FT
PAST SURGICAL HISTORY:  History of left knee surgery ACL repair    History of umbilical hernia repair     Laceration of left thumb with tendon involvement s/p surgical repair

## 2022-04-29 NOTE — H&P PST ADULT - NSICDXPASTMEDICALHX_GEN_ALL_CORE_FT
PAST MEDICAL HISTORY:  2019 novel coronavirus disease (COVID-19) 1/2021  had covid, no intubation, no oxygen, no hospitalization, home quarantine    Diabetes mellitus     HTN (hypertension)     Hyperlipidemia

## 2022-05-02 ENCOUNTER — APPOINTMENT (OUTPATIENT)
Dept: FAMILY MEDICINE | Facility: CLINIC | Age: 62
End: 2022-05-02

## 2022-05-02 VITALS — RESPIRATION RATE: 14 BRPM | OXYGEN SATURATION: 98 % | TEMPERATURE: 97.5 F | HEART RATE: 72 BPM

## 2022-05-02 NOTE — ASSESSMENT
[FreeTextEntry4] : As an out of dictation on this 62-year-old male who is going to be cleared medically for a left knee surgery patient feels well he has a history of diabetes hypertension and hyperlipidemia the patient didn't realize that that he's not having his blood tests until tomorrow subsequently I will do my clearance and throat the physical is concerned I will not be able to clear him to the results of his chemistries are returned here to my office the patient understands that he already has been cleared by cardiology his pressure today was 140/85 his heart had a regular rhythm S1-S2 lungs are clear . EKG and labs were reviewed, patient is cleared for surgical procedure.

## 2022-05-02 NOTE — HISTORY OF PRESENT ILLNESS
[FreeTextEntry1] : Right knee Arthr. ACLR [FreeTextEntry2] : 5/5/2022 [FreeTextEntry3] : Jermaine Love

## 2022-05-04 ENCOUNTER — TRANSCRIPTION ENCOUNTER (OUTPATIENT)
Age: 62
End: 2022-05-04

## 2022-05-05 ENCOUNTER — APPOINTMENT (OUTPATIENT)
Dept: ORTHOPEDIC SURGERY | Facility: HOSPITAL | Age: 62
End: 2022-05-05

## 2022-05-05 ENCOUNTER — TRANSCRIPTION ENCOUNTER (OUTPATIENT)
Age: 62
End: 2022-05-05

## 2022-05-05 ENCOUNTER — OUTPATIENT (OUTPATIENT)
Dept: OUTPATIENT SERVICES | Facility: HOSPITAL | Age: 62
LOS: 1 days | Discharge: HOME | End: 2022-05-05
Payer: MEDICAID

## 2022-05-05 VITALS
OXYGEN SATURATION: 95 % | SYSTOLIC BLOOD PRESSURE: 133 MMHG | WEIGHT: 231.93 LBS | HEART RATE: 63 BPM | RESPIRATION RATE: 16 BRPM | DIASTOLIC BLOOD PRESSURE: 88 MMHG | HEIGHT: 72 IN | TEMPERATURE: 98 F

## 2022-05-05 VITALS
SYSTOLIC BLOOD PRESSURE: 124 MMHG | DIASTOLIC BLOOD PRESSURE: 72 MMHG | OXYGEN SATURATION: 97 % | RESPIRATION RATE: 14 BRPM | HEART RATE: 65 BPM | TEMPERATURE: 98 F

## 2022-05-05 DIAGNOSIS — Z98.89 OTHER SPECIFIED POSTPROCEDURAL STATES: Chronic | ICD-10-CM

## 2022-05-05 DIAGNOSIS — S61.012A LACERATION WITHOUT FOREIGN BODY OF LEFT THUMB WITHOUT DAMAGE TO NAIL, INITIAL ENCOUNTER: Chronic | ICD-10-CM

## 2022-05-05 DIAGNOSIS — Z98.890 OTHER SPECIFIED POSTPROCEDURAL STATES: Chronic | ICD-10-CM

## 2022-05-05 LAB — GLUCOSE BLDC GLUCOMTR-MCNC: 164 MG/DL — HIGH (ref 70–99)

## 2022-05-05 PROCEDURE — 29888 ARTHRS AID ACL RPR/AGMNTJ: CPT | Mod: RT

## 2022-05-05 DEVICE — SCREW INTRFRNCE SHEATHED 8X25MM: Type: IMPLANTABLE DEVICE | Site: RIGHT | Status: FUNCTIONAL

## 2022-05-05 DEVICE — IMPLANTABLE DEVICE: Type: IMPLANTABLE DEVICE | Site: RIGHT | Status: FUNCTIONAL

## 2022-05-05 DEVICE — SCREW INTRFRNCE SHEATHED 7X20MM: Type: IMPLANTABLE DEVICE | Site: RIGHT | Status: FUNCTIONAL

## 2022-05-05 RX ORDER — ASPIRIN/CALCIUM CARB/MAGNESIUM 324 MG
1 TABLET ORAL
Qty: 30 | Refills: 0
Start: 2022-05-05 | End: 2022-06-03

## 2022-05-05 RX ORDER — HYDROMORPHONE HYDROCHLORIDE 2 MG/ML
0.5 INJECTION INTRAMUSCULAR; INTRAVENOUS; SUBCUTANEOUS
Refills: 0 | Status: DISCONTINUED | OUTPATIENT
Start: 2022-05-05 | End: 2022-05-05

## 2022-05-05 RX ORDER — DOCUSATE SODIUM 100 MG
1 CAPSULE ORAL
Qty: 10 | Refills: 0
Start: 2022-05-05 | End: 2022-05-14

## 2022-05-05 RX ORDER — OXYCODONE HYDROCHLORIDE 5 MG/1
1 TABLET ORAL
Qty: 30 | Refills: 0
Start: 2022-05-05

## 2022-05-05 RX ORDER — SODIUM CHLORIDE 9 MG/ML
3 INJECTION INTRAMUSCULAR; INTRAVENOUS; SUBCUTANEOUS EVERY 8 HOURS
Refills: 0 | Status: DISCONTINUED | OUTPATIENT
Start: 2022-05-05 | End: 2022-05-05

## 2022-05-05 RX ORDER — SODIUM CHLORIDE 9 MG/ML
1000 INJECTION, SOLUTION INTRAVENOUS
Refills: 0 | Status: DISCONTINUED | OUTPATIENT
Start: 2022-05-05 | End: 2022-05-05

## 2022-05-05 RX ORDER — HYDROMORPHONE HYDROCHLORIDE 2 MG/ML
1 INJECTION INTRAMUSCULAR; INTRAVENOUS; SUBCUTANEOUS
Refills: 0 | Status: DISCONTINUED | OUTPATIENT
Start: 2022-05-05 | End: 2022-05-05

## 2022-05-05 RX ORDER — ONDANSETRON 8 MG/1
4 TABLET, FILM COATED ORAL ONCE
Refills: 0 | Status: DISCONTINUED | OUTPATIENT
Start: 2022-05-05 | End: 2022-05-05

## 2022-05-05 RX ORDER — ONDANSETRON 8 MG/1
1 TABLET, FILM COATED ORAL
Qty: 20 | Refills: 0
Start: 2022-05-05 | End: 2022-05-09

## 2022-05-05 RX ADMIN — HYDROMORPHONE HYDROCHLORIDE 0.5 MILLIGRAM(S): 2 INJECTION INTRAMUSCULAR; INTRAVENOUS; SUBCUTANEOUS at 12:00

## 2022-05-05 RX ADMIN — HYDROMORPHONE HYDROCHLORIDE 0.5 MILLIGRAM(S): 2 INJECTION INTRAMUSCULAR; INTRAVENOUS; SUBCUTANEOUS at 12:28

## 2022-05-05 RX ADMIN — SODIUM CHLORIDE 120 MILLILITER(S): 9 INJECTION, SOLUTION INTRAVENOUS at 12:00

## 2022-05-05 NOTE — ASU DISCHARGE PLAN (ADULT/PEDIATRIC) - NS MD DC FALL RISK RISK
For information on Fall & Injury Prevention, visit: [email or web address] OR  [email or web address] OR  https://www.cdc.gov/steadi/patient.html

## 2022-05-05 NOTE — ASU DISCHARGE PLAN (ADULT/PEDIATRIC) - ASU DC SPECIAL INSTRUCTIONSFT
Post-op Instructions ACL Surgery    POST-OP MEDICATIONS:     PAIN: You were given a prescription for narcotic pain medication. Take this medication as needed and as directed for breakthrough pain. You should try Extra Strength Tylenol first (500mg every 4 hours; not to exceed 3,000mg in 24 hours)/anti-inflammatories (such as Motrin) regularly to help control pain.      BLOOD CLOT PREVENTION: Anti-coagulation is critical to minimize the risk of a DVT (or blood clot).  We recommend you take Aspirin 325mg once daily as a precaution unless instructed otherwise.     ICE:  An ice device or ice bag (not directly touching the skin) should be utilized to reduce swelling and pain. Please ice every 3-4 hours for about 15-20 minutes each time until swelling subsides. An ice device is typically not covered by insurance but we do have them available to purchase in our office     BRACE: You have been given a knee immobilizer. This should remain on until you are otherwise directed by the doctor at your post-op visit. It may be removed for dressing, and bathing. This brace is essential to protect your new ACL while your leg is weak.     AMBULATION: Non weight bearing Right leg after surgery with two crutches unless otherwise instructed. Wean off your crutches as you feel comfortable with the brace on and locked.     WOUND CARE:  Leave your surgical dressing on for the first 3 days. After 3 days, you may remove your dressing and shower. Do not remove steri-strips. Incisions may get wet but do not soak them and dry it off well. We recommend putting a sterile dry dressing/gauze or bandaid back over the incisions.     FOLLOW UP VISIT: If you do not already have a follow-up visit scheduled, then please call the office to schedule one within 14 days.    Refer to packet by Dr Lockwood for additional instructions. Post-op Instructions ACL Surgery    POST-OP MEDICATIONS:     PAIN: You were given a prescription for narcotic pain medication. Take this medication as needed and as directed for breakthrough pain. You should try Extra Strength Tylenol first (500mg every 4 hours; not to exceed 3,000mg in 24 hours)/anti-inflammatories (such as Motrin) regularly to help control pain.      BLOOD CLOT PREVENTION: Anti-coagulation is critical to minimize the risk of a DVT (or blood clot).  We recommend you take Aspirin 325mg once daily as a precaution unless instructed otherwise.     ICE:  An ice device or ice bag (not directly touching the skin) should be utilized to reduce swelling and pain. Please ice every 3-4 hours for about 15-20 minutes each time until swelling subsides. An ice device is typically not covered by insurance but we do have them available to purchase in our office     BRACE: You have been given a knee immobilizer. This should remain on until you are otherwise directed by the doctor at your post-op visit. It may be removed for dressing, and bathing. This brace is essential to protect your new ACL while your leg is weak. Keep brace locked in extension.    AMBULATION: Partial weight bearing Right leg after surgery with two crutches. Wean off your crutches as you feel comfortable with the brace on and locked.     WOUND CARE:  Leave your surgical dressing on for the first 3 days. After 3 days, you may remove your dressing and shower. Do not remove steri-strips. Incisions may get wet but do not soak them and dry it off well. We recommend putting a sterile dry dressing/gauze or bandaid back over the incisions.     FOLLOW UP VISIT: If you do not already have a follow-up visit scheduled, then please call the office to schedule one within 14 days.    Refer to packet by Dr Lockwood for additional instructions.

## 2022-05-05 NOTE — ASU PATIENT PROFILE, ADULT - FALL HARM RISK - UNIVERSAL INTERVENTIONS
Bed in lowest position, wheels locked, appropriate side rails in place/Call bell, personal items and telephone in reach/Instruct patient to call for assistance before getting out of bed or chair/Non-slip footwear when patient is out of bed/[place] to call system/Physically safe environment - no spills, clutter or unnecessary equipment/Purposeful Proactive Rounding/Room/bathroom lighting operational, light cord in reach

## 2022-05-05 NOTE — ASU DISCHARGE PLAN (ADULT/PEDIATRIC) - CARE PROVIDER_API CALL
Jermaine Lockwood)  Orthopaedic Surgery  [address], [address]  [place] [address]  Phone: [phone]  Fax: [phone]  Follow Up Time:

## 2022-05-10 ENCOUNTER — NON-APPOINTMENT (OUTPATIENT)
Age: 62
End: 2022-05-10

## 2022-05-10 DIAGNOSIS — E11.9 TYPE 2 DIABETES MELLITUS WITHOUT COMPLICATIONS: ICD-10-CM

## 2022-05-10 DIAGNOSIS — Z87.891 PERSONAL HISTORY OF NICOTINE DEPENDENCE: ICD-10-CM

## 2022-05-10 DIAGNOSIS — I10 ESSENTIAL (PRIMARY) HYPERTENSION: ICD-10-CM

## 2022-05-10 DIAGNOSIS — S83.511A SPRAIN OF ANTERIOR CRUCIATE LIGAMENT OF RIGHT KNEE, INITIAL ENCOUNTER: ICD-10-CM

## 2022-05-10 DIAGNOSIS — E78.5 HYPERLIPIDEMIA, UNSPECIFIED: ICD-10-CM

## 2022-05-10 DIAGNOSIS — Z79.84 LONG TERM (CURRENT) USE OF ORAL HYPOGLYCEMIC DRUGS: ICD-10-CM
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
## 2022-05-10 DIAGNOSIS — Z79.82 LONG TERM (CURRENT) USE OF ASPIRIN: ICD-10-CM
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
## 2022-05-18 ENCOUNTER — APPOINTMENT (OUTPATIENT)
Dept: ORTHOPEDIC SURGERY | Facility: CLINIC | Age: 62
End: 2022-05-18
Payer: MEDICAID

## 2022-05-18 VITALS — BODY MASS INDEX: 31.15 KG/M2 | WEIGHT: 230 LBS | HEIGHT: 72 IN

## 2022-05-18 PROBLEM — U07.1 COVID-19: Chronic | Status: ACTIVE | Noted: 2022-04-29

## 2022-05-18 PROBLEM — E78.5 HYPERLIPIDEMIA, UNSPECIFIED: Chronic | Status: ACTIVE | Noted: 2022-04-29

## 2022-05-18 PROCEDURE — 99024 POSTOP FOLLOW-UP VISIT: CPT

## 2022-05-18 NOTE — HISTORY OF PRESENT ILLNESS
[de-identified] : R knee [de-identified] : 61yo M s/p Right knee arthroscopy, anterior cruciate ligament reconstruction with bonepatellar tendonbone allograft, 5/5/22.  He is overall doing well.  His pain is controlled.  He has started physical therapy.  He is using crutches.  He reports using the brace intermittently [de-identified] : Right knee exam\par Incisions are healing well no erythema\par Mild effusion\par Range of motion 0-°60\par Stable anterior drawer and Lachman examination\par no Medial joint line tenderness\par calf is soft and nontender\par Able to flex and extend all toes\par Sensation intact throughout\par brisk capillary refill.\par  [de-identified] : 61yo M s/p Right knee arthroscopy, anterior cruciate ligament reconstruction with bonepatellar tendonbone allograft, 5/5/22. [de-identified] : We reviewed postoperative protocol and restrictions.  We reviewed intraoperative photographs.  Continue with physical therapy.  Continue partial weightbearing with crutches.  Continue brace now unlocked.  He will follow-up 1 month.

## 2022-06-15 ENCOUNTER — RX RENEWAL (OUTPATIENT)
Age: 62
End: 2022-06-15

## 2022-06-20 ENCOUNTER — APPOINTMENT (OUTPATIENT)
Dept: FAMILY MEDICINE | Facility: CLINIC | Age: 62
End: 2022-06-20
Payer: MEDICAID

## 2022-06-20 VITALS
DIASTOLIC BLOOD PRESSURE: 84 MMHG | BODY MASS INDEX: 31.29 KG/M2 | TEMPERATURE: 97.6 F | SYSTOLIC BLOOD PRESSURE: 120 MMHG | HEIGHT: 72 IN | OXYGEN SATURATION: 99 % | WEIGHT: 231 LBS | RESPIRATION RATE: 14 BRPM | HEART RATE: 73 BPM

## 2022-06-20 DIAGNOSIS — G89.29 PAIN IN LEFT SHOULDER: ICD-10-CM

## 2022-06-20 DIAGNOSIS — M25.512 PAIN IN LEFT SHOULDER: ICD-10-CM

## 2022-06-20 PROCEDURE — 99213 OFFICE O/P EST LOW 20 MIN: CPT

## 2022-06-20 NOTE — HISTORY OF PRESENT ILLNESS
[FreeTextEntry1] : Patient is here for 3 month F/U  [de-identified] : 62-year-old male for follow-up.  Patient requesting refills on all medications.\par \par Hyperlipidemia.  On atorvastatin.\par \par Chronic shoulder pain.  On Celebrex as needed\par \par Diabetes.  On Jardiance, Trulicity, and metformin.\par \par Hypertension.  On metoprolol and valsartan–hydrochlorothiazide.

## 2022-06-20 NOTE — PHYSICAL EXAM
[No Acute Distress] : no acute distress [Well Nourished] : well nourished [Well Developed] : well developed [Well-Appearing] : well-appearing [Normal Voice/Communication] : normal voice/communication [Normal Sclera/Conjunctiva] : normal sclera/conjunctiva [No Respiratory Distress] : no respiratory distress  [Clear to Auscultation] : lungs were clear to auscultation bilaterally [Normal Rate] : normal rate  [Soft] : abdomen soft [Speech Grossly Normal] : speech grossly normal [Normal Affect] : the affect was normal [Normal Mood] : the mood was normal

## 2022-06-20 NOTE — PLAN
[FreeTextEntry1] : 62-year-old male for follow-up.\par \par Diabetes.  Continue with Jardiance/metformin/Trulicity at current dose.  Noted that most recent A1c done in April was increased to 7.8 from previous readings.  Patient was advised to follow-up in 1 month for repeat A1c and possible dose adjustment.  He states that his readings at home average around 120s and will get blood work done when he is able to follow-up next.\par \par Hypertension.  Blood pressure well controlled on valsartan/hydrochlorothiazide.\par \par Hyperlipidemia.  Most recent lipid panel December 2021 noted to be appropriate.  Continue atorvastatin.\par \par A. fib.  Per patient, this was diagnosed about a year ago.  He states that he did not need to be on Xarelto per cardiologist and thus discontinued.  Is on aspirin daily.  Will obtain most recent cardiologist notes.  Patient's CHADS-VASc score is 2 considering diagnosis of hypertension and diabetes.\par \par All questions answered.  Patient voiced understanding and agreement above plan.  Return to clinic as recommended.

## 2022-06-22 ENCOUNTER — APPOINTMENT (OUTPATIENT)
Dept: ORTHOPEDIC SURGERY | Facility: CLINIC | Age: 62
End: 2022-06-22
Payer: MEDICAID

## 2022-06-22 PROCEDURE — 99024 POSTOP FOLLOW-UP VISIT: CPT

## 2022-06-22 NOTE — HISTORY OF PRESENT ILLNESS
[de-identified] : R knee [de-identified] : 63yo M s/p Right knee arthroscopy, anterior cruciate ligament reconstruction with bonepatellar tendonbone allograft, 5/5/22.  He is overall doing well.  His pain is controlled.  He stopped using his brace on his own several weeks ago.  He is working with physical therapy [de-identified] : Right knee exam\par Incisions are healing well no erythema\par Mild effusion\par Range of motion 0-°120\par Stable anterior drawer and Lachman examination\par no Medial joint line tenderness\par calf is soft and nontender\par Able to flex and extend all toes\par Sensation intact throughout\par brisk capillary refill.\par  [de-identified] : 63yo M s/p Right knee arthroscopy, anterior cruciate ligament reconstruction with bonepatellar tendonbone allograft, 5/5/22. [de-identified] : We reviewed postoperative protocol and restrictions.  We reviewed postoperative plan.  He no longer needs his brace even though he has not been using it.  Encouraged physical therapy for quadriceps and core strengthening.  We discussed the importance of compliance with postoperative recommendations and expressed understanding.  He will follow-up in 6 weeks

## 2022-08-12 ENCOUNTER — APPOINTMENT (OUTPATIENT)
Dept: ORTHOPEDIC SURGERY | Facility: CLINIC | Age: 62
End: 2022-08-12

## 2022-08-12 VITALS — BODY MASS INDEX: 30.88 KG/M2 | HEIGHT: 72 IN | WEIGHT: 228 LBS

## 2022-08-12 PROCEDURE — 99213 OFFICE O/P EST LOW 20 MIN: CPT

## 2022-08-12 NOTE — PHYSICAL EXAM
[de-identified] : right knee exam\par \par Skin: Clean, dry, intact\par Inspection: No obvious malalignment, no masses, no swelling, no effusion.\par Tenderness: no MJLT. No LJLT. No tenderness over the medial and lateral patella facets. No ttp medial/lateral epicondyle, patella tendon, tibial tubercle, pes anserinus, or proximal fibula.\par ROM: 0 to 130° no pain with deep flexion in both knees\par Stability: Stable to varus, valgus, lachman testing. Negative anterior/posterior drawer.\par Additional tests: Negative McMurrays test, Negative patellar grind test. \par Strength: 5/5 Q/H/TA/GS/EHL, no atrophy\par Neuro: In tact to light touch throughout in dp/sp/tib/victoriano/saph nerve districutions, DTR's normal\par Pulses: 2+ DP/PT pulses.\par

## 2022-08-12 NOTE — DISCUSSION/SUMMARY
[de-identified] : He is doing very well at this time is excellent motion excellent strength and a normal examination for stability.  He will continue his home exercise program focusing on quadriceps and core strengthening.  He will follow-up in 2 to 3 months.  He will be fitted for ACL brace at that time with anticipation of returning to skiing this winter.  He expressed agreement with this plan

## 2022-08-12 NOTE — HISTORY OF PRESENT ILLNESS
[de-identified] : 63yo M s/p Right knee arthroscopy, anterior cruciate ligament reconstruction with bone_patellar tendon_bone allograft, 5/5/22.  He is overall doing well.  He reports no pain.  He reports 2 episodes where he twisted his knee there is been no swelling.  He wishes to play golf and is still looking forward to returning to skiing this winter

## 2022-09-19 ENCOUNTER — RX RENEWAL (OUTPATIENT)
Age: 62
End: 2022-09-19

## 2022-09-19 ENCOUNTER — APPOINTMENT (OUTPATIENT)
Dept: FAMILY MEDICINE | Facility: CLINIC | Age: 62
End: 2022-09-19

## 2022-09-19 VITALS
TEMPERATURE: 98 F | RESPIRATION RATE: 14 BRPM | DIASTOLIC BLOOD PRESSURE: 80 MMHG | BODY MASS INDEX: 31.15 KG/M2 | WEIGHT: 230 LBS | HEIGHT: 72 IN | HEART RATE: 61 BPM | SYSTOLIC BLOOD PRESSURE: 120 MMHG | OXYGEN SATURATION: 97 %

## 2022-09-19 PROCEDURE — 99213 OFFICE O/P EST LOW 20 MIN: CPT | Mod: 25

## 2022-09-19 PROCEDURE — 36415 COLL VENOUS BLD VENIPUNCTURE: CPT

## 2022-09-19 NOTE — HISTORY OF PRESENT ILLNESS
[FreeTextEntry1] : Patient is here for medication refill  [de-identified] : 62-year-old male for follow-up on diabetes. Patient requesting refills on all medications.\par \par On Jardiance, Trulicity, and metformin.\par

## 2022-09-19 NOTE — PLAN
[FreeTextEntry1] : 62-year-old male for follow-up diabetes. Continue with Jardiance/metformin/Trulicity at current dose for now. Noted that most recent A1c done in April was increased to 7.8 from previous readings.  Repeat A1c today.  Patient is aware medication may be adjusted based on results.\par \par All questions answered.  Patient voiced understanding and in agreement above plan.  Return to clinic as recommended.\par \par \par \par

## 2022-09-20 LAB
ALBUMIN SERPL ELPH-MCNC: 5 G/DL
ALP BLD-CCNC: 100 U/L
ALT SERPL-CCNC: 28 U/L
ANION GAP SERPL CALC-SCNC: 15 MMOL/L
AST SERPL-CCNC: 22 U/L
BASOPHILS # BLD AUTO: 0.05 K/UL
BASOPHILS NFR BLD AUTO: 0.6 %
BILIRUB SERPL-MCNC: 0.5 MG/DL
BUN SERPL-MCNC: 11 MG/DL
CALCIUM SERPL-MCNC: 10 MG/DL
CHLORIDE SERPL-SCNC: 105 MMOL/L
CO2 SERPL-SCNC: 24 MMOL/L
CREAT SERPL-MCNC: 0.7 MG/DL
EGFR: 105 ML/MIN/1.73M2
EOSINOPHIL # BLD AUTO: 0.24 K/UL
EOSINOPHIL NFR BLD AUTO: 2.9 %
ESTIMATED AVERAGE GLUCOSE: 180 MG/DL
GLUCOSE SERPL-MCNC: 168 MG/DL
HBA1C MFR BLD HPLC: 7.9 %
HCT VFR BLD CALC: 51.3 %
HGB BLD-MCNC: 17.2 G/DL
IMM GRANULOCYTES NFR BLD AUTO: 0.4 %
LYMPHOCYTES # BLD AUTO: 1.68 K/UL
LYMPHOCYTES NFR BLD AUTO: 20.2 %
MAN DIFF?: NORMAL
MCHC RBC-ENTMCNC: 31 PG
MCHC RBC-ENTMCNC: 33.5 GM/DL
MCV RBC AUTO: 92.4 FL
MONOCYTES # BLD AUTO: 0.75 K/UL
MONOCYTES NFR BLD AUTO: 9 %
NEUTROPHILS # BLD AUTO: 5.58 K/UL
NEUTROPHILS NFR BLD AUTO: 66.9 %
PLATELET # BLD AUTO: 306 K/UL
POTASSIUM SERPL-SCNC: 4.2 MMOL/L
PROT SERPL-MCNC: 7 G/DL
RBC # BLD: 5.55 M/UL
RBC # FLD: 13.4 %
SODIUM SERPL-SCNC: 144 MMOL/L
WBC # FLD AUTO: 8.33 K/UL

## 2022-09-21 ENCOUNTER — TRANSCRIPTION ENCOUNTER (OUTPATIENT)
Age: 62
End: 2022-09-21

## 2022-09-22 ENCOUNTER — NON-APPOINTMENT (OUTPATIENT)
Age: 62
End: 2022-09-22

## 2022-09-26 ENCOUNTER — NON-APPOINTMENT (OUTPATIENT)
Age: 62
End: 2022-09-26

## 2022-10-04 ENCOUNTER — APPOINTMENT (OUTPATIENT)
Dept: ORTHOPEDIC SURGERY | Facility: CLINIC | Age: 62
End: 2022-10-04

## 2022-10-04 PROCEDURE — 99213 OFFICE O/P EST LOW 20 MIN: CPT

## 2022-10-04 NOTE — DISCUSSION/SUMMARY
[de-identified] : History of right knee anterior cruciate ligament reconstruction doing well given prescription today for ACL brace.  In terms of his ankle his symptoms are more consistent with a myotendinous strain or gastrocnemius muscle belly injury he is in a boot weightbearing as tolerated he will start physical therapy.  He will follow-up in 4 to 6 weeks all questions answered

## 2022-10-04 NOTE — PHYSICAL EXAM
[de-identified] : left ankle exam\par \par Skin: Clean, dry, intact\par Inspection: No obvious malalignment, no swelling, no effusion\par Tenderness: No tenderness over the lateral malleolus, medial malleolus, CFL/ATFL/PTFL, deltoid ligament. No tenderness proximal fibula. No tenderness about heel, no pain with heel squeeze.  There is tenderness of the medial gastrocnemius there is plantar flexion of the foot with calf squeeze\par ROM:dorsi flexion 20°, plantar flexion 40° normal subtalar motion. \par Stability: Negative anterior/posterior drawer.\par Additional tests: Negative Mortons compression test, Negative syndesmosis squeeze test.\par Strength: 5/5 TA/GS/EHL\par Sensation: Intact to light touch in dp/sp/tib/victoriano/saph distributions\par Pulses: 2+ DP/PT pulses\par \par right knee exam\par \par Skin: Clean, dry, intact\par Inspection: No obvious malalignment, no masses, no swelling, no effusion.\par Tenderness: no MJLT. No LJLT. No tenderness over the medial and lateral patella facets. No ttp medial/lateral epicondyle, patella tendon, tibial tubercle, pes anserinus, or proximal fibula.\par ROM: 0 to 130° no pain with deep flexion in both knees\par Stability: Stable to varus, valgus, lachman testing. Negative anterior/posterior drawer.\par Additional tests: Negative McMurrays test, Negative patellar grind test. \par Strength: 5/5 Q/H/TA/GS/EHL, no atrophy\par Neuro: In tact to light touch throughout in dp/sp/tib/victoriano/saph nerve districutions, DTR's normal\par Pulses: 2+ DP/PT pulses.\par \par  [de-identified] : Ultrasound of the calf reviewed.  There is partial-thickness tearing of the proximal Achilles tendon.  There is no full-thickness tear

## 2022-10-04 NOTE — HISTORY OF PRESENT ILLNESS
[de-identified] : 62-year-old male history of right knee anterior cruciate ligament reconstruction here with a new issue of a left ankle injury.  1 week ago he took an awkward step and he felt a pop in his posterior calf.  He has been seen by podiatry he is in a boot he reports minimal pain at this time he reports history of Achilles tendinosis his pain is improved

## 2022-10-18 ENCOUNTER — APPOINTMENT (OUTPATIENT)
Dept: ORTHOPEDIC SURGERY | Facility: CLINIC | Age: 62
End: 2022-10-18

## 2022-11-15 ENCOUNTER — APPOINTMENT (OUTPATIENT)
Dept: ORTHOPEDIC SURGERY | Facility: CLINIC | Age: 62
End: 2022-11-15

## 2022-11-15 DIAGNOSIS — S83.511A SPRAIN OF ANTERIOR CRUCIATE LIGAMENT OF RIGHT KNEE, INITIAL ENCOUNTER: ICD-10-CM

## 2022-11-15 PROCEDURE — 99213 OFFICE O/P EST LOW 20 MIN: CPT

## 2022-11-15 NOTE — DISCUSSION/SUMMARY
[de-identified] : 6-month status post right knee anterior cruciate ligament reconstruction doing well he is excellent quadriceps and core strength.  He has a left calf strain which is improving.  He may return to activities as tolerated and he may follow-up as needed

## 2022-11-15 NOTE — HISTORY OF PRESENT ILLNESS
[de-identified] : 63yo M s/p Right knee arthroscopy, anterior cruciate ligament reconstruction with bone_patellar tendon_bone allograft, 5/5/22.  He is overall doing well in terms of his knee.  He is looking forward to skiing this winter he is obtained his Functional brace.  He is very happy with his knee.  He is continue to complain of mild pain in his left calf.

## 2022-11-15 NOTE — PHYSICAL EXAM
[de-identified] : right knee exam\par \par Skin: Clean, dry, intact\par Inspection: No obvious malalignment, no masses, no swelling, no effusion.\par Tenderness: no MJLT. No LJLT. No tenderness over the medial and lateral patella facets. No ttp medial/lateral epicondyle, patella tendon, tibial tubercle, pes anserinus, or proximal fibula.\par ROM: 0 to 130° no pain with deep flexion in both knees\par Stability: Stable to varus, valgus, lachman testing. Negative anterior/posterior drawer.\par Additional tests: Negative McMurrays test, Negative patellar grind test. \par Strength: 5/5 Q/H/TA/GS/EHL, no atrophy\par Neuro: In tact to light touch throughout in dp/sp/tib/victoriano/saph nerve districutions, DTR's normal\par Pulses: 2+ DP/PT pulses.\par \par Left ankle exam\par There is plantar flexion of the foot with calf squeeze.  There is no tenderness about the Achilles.  There is tenderness in the medial gastrocnemius muscle belly.\par Full range of motion\par nvid

## 2022-11-29 ENCOUNTER — APPOINTMENT (OUTPATIENT)
Dept: GASTROENTEROLOGY | Facility: CLINIC | Age: 62
End: 2022-11-29

## 2022-11-29 VITALS
DIASTOLIC BLOOD PRESSURE: 90 MMHG | BODY MASS INDEX: 31.83 KG/M2 | RESPIRATION RATE: 14 BRPM | HEIGHT: 72 IN | OXYGEN SATURATION: 98 % | WEIGHT: 235 LBS | TEMPERATURE: 97.2 F | HEART RATE: 69 BPM | SYSTOLIC BLOOD PRESSURE: 160 MMHG

## 2022-11-29 DIAGNOSIS — Z13.9 ENCOUNTER FOR SCREENING, UNSPECIFIED: ICD-10-CM

## 2022-11-29 PROCEDURE — 99203 OFFICE O/P NEW LOW 30 MIN: CPT

## 2022-11-29 RX ORDER — RIVAROXABAN 20 MG/1
20 TABLET, FILM COATED ORAL
Refills: 0 | Status: DISCONTINUED | COMMUNITY
Start: 2022-03-17 | End: 2022-11-29

## 2022-11-29 NOTE — PHYSICAL EXAM
[Alert] : alert [Normal Voice/Communication] : normal voice/communication [Healthy Appearing] : healthy appearing [No Acute Distress] : no acute distress [Sclera] : the sclera and conjunctiva were normal [Hearing Threshold Finger Rub Not [place]] : hearing was normal [Normal Lips/Gums] : the lips and gums were normal [Oropharynx] : the oropharynx was normal [Normal Appearance] : the appearance of the neck was normal [No Neck Mass] : no neck mass was observed [No Respiratory Distress] : no respiratory distress [No Acc Muscle Use] : no accessory muscle use [Respiration, Rhythm And Depth] : normal respiratory rhythm and effort [Auscultation Breath Sounds / Voice Sounds] : lungs were clear to auscultation bilaterally [Heart Rate And Rhythm] : heart rate was normal and rhythm regular [Normal S1, S2] : normal S1 and S2 [Murmurs] : no murmurs [Bowel Sounds] : normal bowel sounds [Abdomen Tenderness] : non-tender [No Masses] : no abdominal mass palpated [Abdomen Soft] : soft [] : no hepatosplenomegaly [Oriented To Time, Place, And Person] : oriented to person, place, and time

## 2022-11-29 NOTE — HISTORY OF PRESENT ILLNESS
[FreeTextEntry1] : The patient arrived for a consultation for scheduling a colonoscopy.  Had a colonoscopy in 2016.  2 polyps were noted.  Diverticulosis was noted.  He has a history of orthopedic surgery.  Otherwise denying any other complaints.  He will be evaluated by his cardiologist due to history of A. fib.  He goes for regular medical checkups.

## 2022-11-29 NOTE — ASSESSMENT
[FreeTextEntry1] : Patient is due for his surveillance colonoscopy due to personal history of colon polyps.  Otherwise no GI complaints. The bowel preparation was discussed at length. Risks (including bleeding, pain, perforation, incomplete examination, splenic laceration, adverse reactions to medications, aspiration and death), benefits and alternatives were discussed. Patient is agreeable for the colonoscopy. The patient is medically optimized for the procedure. We will schedule the patient for the procedure. Bowel preparation was sent to the pharmacy.\par \par Ezio Gonzalez MD\par Gastroenterology \par \par

## 2023-02-22 NOTE — ASU PATIENT PROFILE, ADULT - PATIENT KNOW
Pt moved to recliner per request at this time        Solomon Murdock RN  02/21/23 0619
Roberta Alvarez RN  02/21/23 7186
Tele available on CW2        Robert Richardson RN  02/21/23 6047
yes

## 2023-02-24 ENCOUNTER — APPOINTMENT (OUTPATIENT)
Dept: FAMILY MEDICINE | Facility: CLINIC | Age: 63
End: 2023-02-24

## 2023-03-12 ENCOUNTER — TRANSCRIPTION ENCOUNTER (OUTPATIENT)
Age: 63
End: 2023-03-12

## 2023-03-12 NOTE — PHYSICAL EXAM
[Alert] : alert [Healthy Appearing] : healthy appearing [Normal Voice/Communication] : normal voice/communication [No Acute Distress] : no acute distress [Sclera] : the sclera and conjunctiva were normal [Normal Lips/Gums] : the lips and gums were normal [Hearing Threshold Finger Rub Not [place]] : hearing was normal [Oropharynx] : the oropharynx was normal [Normal Appearance] : the appearance of the neck was normal [No Neck Mass] : no neck mass was observed [No Respiratory Distress] : no respiratory distress [Respiration, Rhythm And Depth] : normal respiratory rhythm and effort [No Acc Muscle Use] : no accessory muscle use [Auscultation Breath Sounds / Voice Sounds] : lungs were clear to auscultation bilaterally [Heart Rate And Rhythm] : heart rate was normal and rhythm regular [Normal S1, S2] : normal S1 and S2 [Bowel Sounds] : normal bowel sounds [Murmurs] : no murmurs [Abdomen Tenderness] : non-tender [No Masses] : no abdominal mass palpated [Abdomen Soft] : soft [] : no hepatosplenomegaly [Oriented To Time, Place, And Person] : oriented to person, place, and time

## 2023-03-13 ENCOUNTER — APPOINTMENT (OUTPATIENT)
Dept: GASTROENTEROLOGY | Facility: GI CENTER | Age: 63
End: 2023-03-13
Payer: MEDICAID

## 2023-03-13 ENCOUNTER — OUTPATIENT (OUTPATIENT)
Dept: OUTPATIENT SERVICES | Facility: HOSPITAL | Age: 63
LOS: 1 days | End: 2023-03-13
Payer: COMMERCIAL

## 2023-03-13 ENCOUNTER — RESULT REVIEW (OUTPATIENT)
Age: 63
End: 2023-03-13

## 2023-03-13 DIAGNOSIS — Z86.010 PERSONAL HISTORY OF COLONIC POLYPS: ICD-10-CM

## 2023-03-13 DIAGNOSIS — Z98.89 OTHER SPECIFIED POSTPROCEDURAL STATES: Chronic | ICD-10-CM

## 2023-03-13 LAB — GLUCOSE BLDC GLUCOMTR-MCNC: 144 MG/DL — HIGH (ref 70–99)

## 2023-03-13 PROCEDURE — 45385 COLONOSCOPY W/LESION REMOVAL: CPT

## 2023-03-13 PROCEDURE — 88305 TISSUE EXAM BY PATHOLOGIST: CPT | Mod: 26

## 2023-03-13 PROCEDURE — 45380 COLONOSCOPY AND BIOPSY: CPT | Mod: XU,PT

## 2023-03-13 PROCEDURE — 45380 COLONOSCOPY AND BIOPSY: CPT | Mod: 33,59

## 2023-03-13 PROCEDURE — 45385 COLONOSCOPY W/LESION REMOVAL: CPT | Mod: PT

## 2023-03-13 PROCEDURE — 82962 GLUCOSE BLOOD TEST: CPT

## 2023-03-13 PROCEDURE — 88305 TISSUE EXAM BY PATHOLOGIST: CPT

## 2023-03-13 NOTE — HISTORY OF PRESENT ILLNESS
[FreeTextEntry1] : Patient has arrived for the colonoscopy. No complaints.Colonoscopy in 02/16/2016.

## 2023-03-16 LAB — SURGICAL PATHOLOGY STUDY: SIGNIFICANT CHANGE UP

## 2023-03-20 ENCOUNTER — NON-APPOINTMENT (OUTPATIENT)
Age: 63
End: 2023-03-20

## 2023-03-22 ENCOUNTER — APPOINTMENT (OUTPATIENT)
Dept: FAMILY MEDICINE | Facility: CLINIC | Age: 63
End: 2023-03-22
Payer: MEDICAID

## 2023-03-22 VITALS
OXYGEN SATURATION: 96 % | BODY MASS INDEX: 31.83 KG/M2 | HEIGHT: 72 IN | DIASTOLIC BLOOD PRESSURE: 86 MMHG | HEART RATE: 65 BPM | TEMPERATURE: 97.6 F | SYSTOLIC BLOOD PRESSURE: 146 MMHG | WEIGHT: 235 LBS

## 2023-03-22 DIAGNOSIS — I48.91 UNSPECIFIED ATRIAL FIBRILLATION: ICD-10-CM

## 2023-03-22 DIAGNOSIS — Z00.00 ENCOUNTER FOR GENERAL ADULT MEDICAL EXAMINATION W/OUT ABNORMAL FINDINGS: ICD-10-CM

## 2023-03-22 DIAGNOSIS — N40.0 BENIGN PROSTATIC HYPERPLASIA WITHOUT LOWER URINARY TRACT SYMPMS: ICD-10-CM

## 2023-03-22 DIAGNOSIS — E11.40 TYPE 2 DIABETES MELLITUS WITH DIABETIC NEUROPATHY, UNSPECIFIED: ICD-10-CM

## 2023-03-22 PROCEDURE — 99396 PREV VISIT EST AGE 40-64: CPT | Mod: 25

## 2023-03-22 RX ORDER — BLOOD SUGAR DIAGNOSTIC
STRIP MISCELLANEOUS 3 TIMES DAILY
Qty: 270 | Refills: 2 | Status: ACTIVE | COMMUNITY
Start: 1900-01-01 | End: 1900-01-01

## 2023-03-22 RX ORDER — DULAGLUTIDE 0.75 MG/.5ML
0.75 INJECTION, SOLUTION SUBCUTANEOUS
Qty: 2 | Refills: 0 | Status: DISCONTINUED | COMMUNITY
Start: 2022-07-26 | End: 2023-03-22

## 2023-03-22 RX ORDER — VALSARTAN AND HYDROCHLOROTHIAZIDE 160; 12.5 MG/1; MG/1
160-12.5 TABLET, FILM COATED ORAL
Qty: 90 | Refills: 0 | Status: DISCONTINUED | COMMUNITY
Start: 2022-06-15 | End: 2023-03-22

## 2023-03-22 RX ORDER — POLYETHYLENE GLYCOL-3350 AND ELECTROLYTES 236; 6.74; 5.86; 2.97; 22.74 G/274.31G; G/274.31G; G/274.31G; G/274.31G; G/274.31G
236 POWDER, FOR SOLUTION ORAL
Qty: 1 | Refills: 0 | Status: DISCONTINUED | COMMUNITY
Start: 2022-11-29 | End: 2023-03-22

## 2023-03-22 NOTE — REVIEW OF SYSTEMS
[Fever] : no fever [Chills] : no chills [Chest Pain] : no chest pain [Palpitations] : no palpitations [Lower Ext Edema] : no lower extremity edema [Shortness Of Breath] : no shortness of breath [Wheezing] : no wheezing [Abdominal Pain] : no abdominal pain [Nausea] : no nausea [Vomiting] : no vomiting [Dysuria] : no dysuria [Hematuria] : no hematuria [Headache] : no headache [Dizziness] : no dizziness

## 2023-03-22 NOTE — ASSESSMENT
[FreeTextEntry1] : CPE:\par -will order CBC w/ diff, CMP, Lipid, TSH and HgbA1c, labs to be drawn in office\par -Colon Cancer screening: had this year, had polyps, due for repeat in 1 year\par -Vaccinations:  PNA: recommended, Td: recommended, Shingles: up to date\par -patient currently sees Dr. Corrigan for cardiology\par -follows with urology for elevated PSA\par \par HTN:\par patient with elevated bp at todays visit\par continue metoprolol succinate 50 mg daily, valsartan–hydrochlorothiazide 320-12.5 mg daily\par Will restart amlodipine at previous dose of 10 mg daily\par \par Type 2 diabetes\par will continue Jardiance 25 mg daily, Trulicity 1.5 mg once weekly and metformin 1000 mg twice a day\par will check hgba1c today and medications will be adjusted accordingly\par Patient is up-to-date with annual foot exam, recommend patient go for annual eye exam\par \par HLD:\par -continue atorvastatin 10 mg daily\par -will check lipid panel today\par \par Hx of Afib\par Previously had been on anticoagulation however been on it as he is concerned about possible side effects\par Discussed with patient that having Afib puts him at increased risk of stroke and encouraged him to consider restarting anticoagulation\par Continue metoprolol succinate 50 mg daily\par Continue aspirin 81 mg daily\par Follow-up with Dr. Corrigan cardiology\par \par Hx of BPH\par With elevated PSA\par Continue to follow with urology for management

## 2023-03-22 NOTE — HISTORY OF PRESENT ILLNESS
[FreeTextEntry1] : cpe [de-identified] : 62 y/o male with pmhx of afib, t2dm, hld, htn, bph presents for cpe. Patient is currently seeing podiatry for a torn Achilles. States his is overall doing well.

## 2023-03-23 LAB
ALBUMIN SERPL ELPH-MCNC: 4.5 G/DL
ALP BLD-CCNC: 91 U/L
ALT SERPL-CCNC: 31 U/L
ANION GAP SERPL CALC-SCNC: 16 MMOL/L
AST SERPL-CCNC: 19 U/L
BASOPHILS # BLD AUTO: 0.06 K/UL
BASOPHILS NFR BLD AUTO: 0.7 %
BILIRUB SERPL-MCNC: 0.5 MG/DL
BUN SERPL-MCNC: 14 MG/DL
CALCIUM SERPL-MCNC: 9.5 MG/DL
CHLORIDE SERPL-SCNC: 106 MMOL/L
CHOLEST SERPL-MCNC: 149 MG/DL
CO2 SERPL-SCNC: 20 MMOL/L
CREAT SERPL-MCNC: 0.62 MG/DL
EGFR: 109 ML/MIN/1.73M2
EOSINOPHIL # BLD AUTO: 0.2 K/UL
EOSINOPHIL NFR BLD AUTO: 2.2 %
ESTIMATED AVERAGE GLUCOSE: 169 MG/DL
FOLATE SERPL-MCNC: 17.7 NG/ML
GLUCOSE SERPL-MCNC: 164 MG/DL
HBA1C MFR BLD HPLC: 7.5 %
HCT VFR BLD CALC: 49.7 %
HDLC SERPL-MCNC: 41 MG/DL
HGB BLD-MCNC: 16.7 G/DL
IMM GRANULOCYTES NFR BLD AUTO: 0.4 %
LDLC SERPL CALC-MCNC: 81 MG/DL
LYMPHOCYTES # BLD AUTO: 1.59 K/UL
LYMPHOCYTES NFR BLD AUTO: 17.7 %
MAN DIFF?: NORMAL
MCHC RBC-ENTMCNC: 30.7 PG
MCHC RBC-ENTMCNC: 33.6 GM/DL
MCV RBC AUTO: 91.4 FL
MONOCYTES # BLD AUTO: 0.75 K/UL
MONOCYTES NFR BLD AUTO: 8.3 %
NEUTROPHILS # BLD AUTO: 6.36 K/UL
NEUTROPHILS NFR BLD AUTO: 70.7 %
NONHDLC SERPL-MCNC: 108 MG/DL
PLATELET # BLD AUTO: 309 K/UL
POTASSIUM SERPL-SCNC: 4.1 MMOL/L
PROT SERPL-MCNC: 6.7 G/DL
RBC # BLD: 5.44 M/UL
RBC # FLD: 13.2 %
SODIUM SERPL-SCNC: 142 MMOL/L
TRIGL SERPL-MCNC: 137 MG/DL
TSH SERPL-ACNC: 1.16 UIU/ML
VIT B12 SERPL-MCNC: >2000 PG/ML
WBC # FLD AUTO: 9 K/UL

## 2023-03-24 LAB
CREAT SPEC-SCNC: 69 MG/DL
MICROALBUMIN 24H UR DL<=1MG/L-MCNC: 1.2 MG/DL
MICROALBUMIN/CREAT 24H UR-RTO: NORMAL MG/G

## 2023-06-16 ENCOUNTER — NON-APPOINTMENT (OUTPATIENT)
Age: 63
End: 2023-06-16

## 2023-06-16 ENCOUNTER — APPOINTMENT (OUTPATIENT)
Dept: ORTHOPEDIC SURGERY | Facility: CLINIC | Age: 63
End: 2023-06-16
Payer: MEDICAID

## 2023-06-16 VITALS — BODY MASS INDEX: 31.83 KG/M2 | WEIGHT: 235 LBS | HEIGHT: 72 IN

## 2023-06-16 DIAGNOSIS — S86.012D STRAIN OF LEFT ACHILLES TENDON, SUBSEQUENT ENCOUNTER: ICD-10-CM

## 2023-06-16 PROCEDURE — 99214 OFFICE O/P EST MOD 30 MIN: CPT

## 2023-06-16 NOTE — DISCUSSION/SUMMARY
[de-identified] : Today I had a lengthy discussion with the patient regarding their Left Achilles Tendinosis pain. I have addressed all the patient's concerns surrounding the pathology of their condition. The results of the patient's MRI obtained on 2/17/23 was interpreted by me and discussed with the patient in detail today in office. In order to provide the patient with a better understanding of their ailment, I educated them about the anatomy, physiology and lifespan of their condition using a foot model. Discussed with the patient conservative and surgical treatment options. A lengthy discussion was had about the surgery. All risks, benefits and alternatives to the recommended surgical procedure were discussed which include but are not limited to bleeding, infection, nerve damage, vascular damage, failure of the wound to heal, the need for further surgery, loss of limb, DVT, PE, loss of life as well as the risks associated with general anesthesia. The patient verbalized understanding and provided informed consent to move forward with surgery. I advised the patient to consult with their PCP about lowering their A1c level below 7.0. I encouraged the patient to quit smoking and educated them about the risks that smoking has on bone healing. \par \par \par The patient understood and verbally agreed to the treatment plan. All of their questions were answered and they were satisfied with the visit. The patient should call the office if they have any questions or experience worsening symptoms. \par \par I spoke to the patient at length regarding the surgical procedure which would include a mid substance debridement with an open book procedure to then possibly transfer the flexor houses longus tendon transfer.  I described the postoperative recovery with him as well and the length of time that he will require to limit his activities.  He did mention his desire to [place] ski [place] in December which I do believe is possible but obviously could not guarantee him this at that time.  He wishes to proceed with surgical intervention.  We will schedule him as an elective surgery.  He is aware of the medical clearance and optimization as required.\par

## 2023-06-16 NOTE — PHYSICAL EXAM
[de-identified] : General: Alert and oriented x3. In no acute distress. Pleasant in nature with a normal affect. No apparent respiratory distress.\par \par Left Ankle Exam\par \par Skin: Clean, dry, intact\par Inspection:  No obvious malalignment, no swelling, no effusion; no lymphadenopathy\par Pulses: 2+ DP/PT pulses\par \par ROM: Left Ankle 10 degrees of dorsiflexion, 40 degrees of plantarflexion, 10 degrees of subtalar motion\par Tenderness: no insertional pain,No tenderness over the lateral malleolus, no CFL/ATFL/PTFL pain. No medial malleolus pain, no deltoid ligament pain. No proximal fibular pain. No heel pain.\par Stability: Negative anterior/posterior drawer.\par Strength: 5/5 TA/GS/EHL\par Neuro: In tact to light touch throughout\par Additional tests: Negative Mortons test, Negative syndesmosis squeeze test. \par \par *Thickening posterior Achilles mid to distal  [de-identified] : MRI-LEFT ANKLE NON CONTRAST\par \par History: Diabetic patient with left ankle pain. Concern for Achilles\par tendinopathy.\par \par M25.572\par M79.672\par \par Comparison Studies: Left ankle MR examination from 2/17/2023. Left Achilles\par tendon sonography from 9/27/2022.\par \par Technique: The left ankle, hindfoot and proximal midfoot were imaged in a 3.0\par Deepa ultra high field magnetic resonance imaging unit. Sagittal proton density\par and STIR images; coronal proton density images; and axial proton density and\par fat-saturated proton density images were obtained.\par \par Findings:\par \par The skin and subcutis are unremarkable.\par \par There is a small tibiotalar joint effusion. There is upper limits of normal\par fluid in the subtalar joint. No bursal effusion is visualized.\par \par The plantar aponeurosis is unremarkable in MR appearance, without evidence of\par enthesopathy, fasciitis, fascial tear or fascial nodule.\par \par The structures of the sinus tarsi exhibit normal morphology and signal, and the\par sinus tarsi fat is well-preserved.\par \par The visualized neurovascular structures are unremarkable.\par \par There is severe chronic Achilles tendinosis in the critical zone, with tendon\par edema, tendon thickening and partial tendon tearing. This partial tearing has\par undergone some degree of healing since the 2/17/2023 study. See sagittal series\par 8, image 18; and axial series 10, image 26; compare to sagittal image 18 of\par series 8 and axial image 9 of series 4 from the previous MR study.\par \par There is a complete or near-complete chronic tear of the peroneus brevis tendon,\par with associated muscle atrophy and accompanying minimal tenosynovitis. See axial\par series 4, image 5-24. This is unchanged from the February 2023 MR study. There\par is minimal peroneus longus tendinosis.\par \par The extensor tendons and medial flexor tendons are unremarkable.\par \par No capsuloligamentous abnormality is visualized.\par \par There is no evidence of stress injury, fracture, osteonecrosis or osseous\par neoplasm.\par \par No significant arthritic changes are seen within the included articulations.\par \par The visualized muscle bellies exhibit normal size, shape and signal, without\par evidence of edema or atrophy.\par \par No soft tissue mass is visualized.\par \par No soft tissue cyst or extrasynovial fluid collection is visualized.\par \par IMPRESSION:\par \par Severe chronic Achilles tendinosis, with nonretracted chronic partial tearing in\par the critical zone. There is minimal some degree of healing of this partial\par tearing since the February 2023 MR examination.\par \par Complete or near-complete chronic tear of the peroneus brevis tendon as\par described, with minimal tenosynovitis, essentially unchanged. Minimal peroneus\par longus tendinosis.\par \par Small tibiotalar joint effusion.\par \par M67.02\par S86.312A\par M65.9\par M25.472\par \par Signed by: Kelvin Argueta MD\par Signed Date: 6/8/2023 4:19 PM EDT\par \par \par \par SIGNED BY: Kelvin Argueta M.D., Ext. [id number] 06/08/2023 04:19 PM\par \par IMPRESSION:\par \par Severe chronic Achilles tendinosis, with nonretracted chronic partial tearing in\par the critical zone. There is minimal some degree of healing of this partial\par tearing since the February 2023 MR examination.\par \par Complete or near-complete chronic tear of the peroneus brevis tendon as\par described, with minimal tenosynovitis, essentially unchanged. Minimal peroneus\par longus tendinosis.\par \par Small tibiotalar joint effusion.\par \par M67.02\par S86.312A\par M65.9\par M25.472\par \par Signed by: Kelvin Argueta MD\par Signed Date: 6/8/2023 4:19 PM EDT\par \par \par \par SIGNED BY: Kelvin Argueta M.D., Ext. [id number] 06/08/2023 04:19 PM\par \par _______________________________________________________________________________________________________________________\par \par MRI-LEFT FOOT NON CONTRAST\par \par History: Diabetic patient with numbness, stiffness and tingling in the left\par ankle and hindfoot. Lump in the region of the Achilles tendon.\par \par M25.672\par R22.42\par \par Comparison Studies: None\par \par Technique: The left ankle, hindfoot and proximal midfoot were imaged in a 1.5\par Deepa high field magnetic resonance imaging unit. Sagittal proton density and\par STIR images; coronal proton density images; and axial proton density and\par fat-saturated proton density images were obtained.\par \par Findings:\par Vitamin E skin markers were placed at the proximal and distal aspects of the\par region of interest as indicated by the patient. The markers are visualized on\par sagittal series 5, image 14-17, for example. The proximal marker is situated at\par the posterolateral aspect of the Achilles tendon at the level of the distal\par tibiofibular syndesmosis; the distal marker is situated 3.9 cm distal to the\par proximal marker, at the posterior aspect of the Achilles tendon level\par approximately 2.4 cm above the tendon insertion..\par \par \par The skin and subcutis are unremarkable.\par \par Physiologic fluid is seen within the included articulations. No bursal effusion\par is visualized.\par \par There is a small plantar calcaneal enthesophyte at the plantar fascia\par attachment.\par \par The structures of the sinus tarsi exhibit normal morphology and signal, and the\par sinus tarsi fat is well-preserved.\par \par The visualized neurovascular structures are unremarkable.\par \par There is severe chronic Achilles tendinosis, with tendon expansion and\par associated extensive nonretracted chronic partial tearing in the critical zone\par of the tendon. The partial tearing involves approximately 60% of the tendon\par substance, and extends over a craniocaudad tendon segment measuring\par approximately 3.8 cm, and ending roughly 3.2 cm above the tendon insertion.\par These findings appear on sagittal series 5 and series 8, image 15-20; and on\par axial series 3 and series 4, image 1-15.\par \par There is mild chronic partial tear of the peroneus longus tendon at the level of\par the lateral malleolus. The peroneus brevis tendon is unremarkable. The medial\par flexor tendons and extensor tendons are unremarkable.\par \par No capsuloligamentous abnormality is visualized.\par \par There is no evidence of stress injury, fracture, osteonecrosis or osseous\par neoplasm.\par \par No significant arthritic changes are seen within the included articulations.\par \par The visualized muscle bellies exhibit normal size, shape and signal, without\par evidence of edema or atrophy.\par \par No soft tissue mass is visualized.\par \par No soft tissue cyst or extrasynovial fluid collection is visualized.\par \par IMPRESSION:\par \par Severe chronic Achilles tendinosis, with extensive nonretracted chronic partial\par tearing in the critical zone of the tendon. See description above.\par \par Mild chronic partial tear of the peroneus longus tendon at the level of the\par lateral malleolus.\par \par Small plantar calcaneal enthesophyte.\par \par M67.02\par S86.012A\par S86.312A\par \par Signed by: Kelvin Argueta MD\par Signed Date: 2/17/2023 5:49 PM EST\par \par \par \par SIGNED BY: Kelvin Argueta M.D., Ext. [id number] 02/17/2023 05:49 PM\par

## 2023-06-16 NOTE — ADDENDUM
[FreeTextEntry1] : I, BARRY LEDEZMA, acted solely as a scribe for Dr. Maninder Low on this date 06/16/2023  .\par  \par All medical record entries made by the Scribe were at my, Dr. Maninder Low, direction and personally dictated by me on 06/16/2023 . I have reviewed the chart and agree that the record accurately reflects my personal performance of the history, physical exam, assessment and plan. I have also personally directed, reviewed, and agreed with the chart.

## 2023-06-16 NOTE — HISTORY OF PRESENT ILLNESS
[FreeTextEntry1] : The patient is a 63 year old male presenting for an initial visit of left Achilles Tendon pain. Last September the patient was walking down a set of stairs with two luggage's in hand, when he slipped on the step, lunged forward and felt a pop in the back of his ankle. He initially saw Dr. Lockwood where he was treated conservatively with physical therapy. Then during the winter, he attempted to ski where and continued to have pain. He obtained an MRI where he was told that he had a tear in his left Achilles tendon. He also states that he rolled his ankle while walking on uneven ground and began o experience stiffness of the ankle. He currently endorses some stiffness and pain when he is on his feet for prolonged periods of time. He presents  today seeking a second opinion.\par Hx of DM managed with medication, Last Known  A1c: 7.1.

## 2023-06-27 ENCOUNTER — APPOINTMENT (OUTPATIENT)
Dept: FAMILY MEDICINE | Facility: CLINIC | Age: 63
End: 2023-06-27
Payer: MEDICAID

## 2023-06-27 VITALS
HEIGHT: 72 IN | HEART RATE: 60 BPM | SYSTOLIC BLOOD PRESSURE: 150 MMHG | BODY MASS INDEX: 31.56 KG/M2 | RESPIRATION RATE: 14 BRPM | DIASTOLIC BLOOD PRESSURE: 81 MMHG | OXYGEN SATURATION: 96 % | WEIGHT: 233 LBS

## 2023-06-27 PROCEDURE — 99214 OFFICE O/P EST MOD 30 MIN: CPT | Mod: 25

## 2023-06-27 NOTE — ASSESSMENT
[FreeTextEntry1] : HTN:\par bp stable\par continue metoprolol succinate 100 mg daily and valsartan–hydrochlorothiazide 320-12.5 mg daily\par \par Type 2 diabetes\par will continue Jardiance 25 mg daily, Trulicity 3 mg once weekly and metformin 1000 mg twice a day\par will check hgba1c today and medications will be adjusted accordingly\par Patient is up-to-date with annual foot exam, recommend patient go for annual eye exam\par \par HLD:\par -continue atorvastatin 10 mg daily

## 2023-06-27 NOTE — HISTORY OF PRESENT ILLNESS
[FreeTextEntry1] : Medication refill [de-identified] : 64 y/o male with pmhx of afib, t2dm, hld, htn, bph presents for follow up\par \par For type 2 diabetes, most recent hemoglobin A1c of 7.5.  Trulicity was increased to 3 mg weekly.  Patient states that he has been trying to work on improving his diet however he does at times eat sweets and high carb foods.\par \par Patient with a torn Achilles tendon.  He will be going for surgery with Dr. Low next month.\par \par For hypertension, patient states he went to see his cardiologist and was told not to restart amlodipine and instead his dose of metoprolol was increased to 100 mg daily.  He is also on valsartan–hydrochlorothiazide 320-12.5 mg daily

## 2023-06-29 LAB
ESTIMATED AVERAGE GLUCOSE: 163 MG/DL
HBA1C MFR BLD HPLC: 7.3 %

## 2023-07-06 ENCOUNTER — APPOINTMENT (OUTPATIENT)
Dept: ORTHOPEDIC SURGERY | Facility: CLINIC | Age: 63
End: 2023-07-06

## 2023-07-19 ENCOUNTER — OUTPATIENT (OUTPATIENT)
Dept: OUTPATIENT SERVICES | Facility: HOSPITAL | Age: 63
LOS: 1 days | End: 2023-07-19
Payer: MEDICAID

## 2023-07-19 VITALS
DIASTOLIC BLOOD PRESSURE: 81 MMHG | WEIGHT: 235.89 LBS | HEIGHT: 72 IN | SYSTOLIC BLOOD PRESSURE: 145 MMHG | HEART RATE: 62 BPM | TEMPERATURE: 97 F | RESPIRATION RATE: 16 BRPM | OXYGEN SATURATION: 98 %
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
## 2023-07-19 DIAGNOSIS — Z98.890 OTHER SPECIFIED POSTPROCEDURAL STATES: Chronic | ICD-10-CM

## 2023-07-19 DIAGNOSIS — Z98.89 OTHER SPECIFIED POSTPROCEDURAL STATES: Chronic | ICD-10-CM

## 2023-07-19 DIAGNOSIS — S86.002A UNSPECIFIED INJURY OF LEFT ACHILLES TENDON, INITIAL ENCOUNTER: ICD-10-CM

## 2023-07-19 DIAGNOSIS — Z01.818 ENCOUNTER FOR OTHER PREPROCEDURAL EXAMINATION: ICD-10-CM
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
## 2023-07-19 DIAGNOSIS — S61.012A LACERATION WITHOUT FOREIGN BODY OF LEFT THUMB WITHOUT DAMAGE TO NAIL, INITIAL ENCOUNTER: Chronic | ICD-10-CM

## 2023-07-19 LAB
A1C WITH ESTIMATED AVERAGE GLUCOSE RESULT: 8.3 % — HIGH (ref 4–5.6)
ABO RH CONFIRMATION: SIGNIFICANT CHANGE UP
ANION GAP SERPL CALC-SCNC: 5 MMOL/L — SIGNIFICANT CHANGE UP (ref 5–17)
APTT BLD: 30.8 SEC — SIGNIFICANT CHANGE UP (ref 27.5–35.5)
BASOPHILS # BLD AUTO: 0.07 K/UL — SIGNIFICANT CHANGE UP (ref 0–0.2)
BASOPHILS NFR BLD AUTO: 0.8 % — SIGNIFICANT CHANGE UP (ref 0–2)
BLD GP AB SCN SERPL QL: SIGNIFICANT CHANGE UP
BUN SERPL-MCNC: 17 MG/DL — SIGNIFICANT CHANGE UP (ref 7–23)
CALCIUM SERPL-MCNC: 9.4 MG/DL — SIGNIFICANT CHANGE UP (ref 8.5–10.1)
CHLORIDE SERPL-SCNC: 109 MMOL/L — HIGH (ref 96–108)
CO2 SERPL-SCNC: 27 MMOL/L — SIGNIFICANT CHANGE UP (ref 22–31)
CREAT SERPL-MCNC: 1.1 MG/DL — SIGNIFICANT CHANGE UP (ref 0.5–1.3)
EGFR: 76 ML/MIN/1.73M2 — SIGNIFICANT CHANGE UP
EOSINOPHIL # BLD AUTO: 0.2 K/UL — SIGNIFICANT CHANGE UP (ref 0–0.5)
EOSINOPHIL NFR BLD AUTO: 2.2 % — SIGNIFICANT CHANGE UP (ref 0–6)
ESTIMATED AVERAGE GLUCOSE: 192 MG/DL — HIGH (ref 68–114)
GLUCOSE SERPL-MCNC: 241 MG/DL — HIGH (ref 70–99)
HCT VFR BLD CALC: 46.6 % — SIGNIFICANT CHANGE UP (ref 39–50)
HGB BLD-MCNC: 16.6 G/DL — SIGNIFICANT CHANGE UP (ref 13–17)
IMM GRANULOCYTES NFR BLD AUTO: 0.3 % — SIGNIFICANT CHANGE UP (ref 0–0.9)
INR BLD: 0.97 RATIO — SIGNIFICANT CHANGE UP (ref 0.88–1.16)
LYMPHOCYTES # BLD AUTO: 1.35 K/UL — SIGNIFICANT CHANGE UP (ref 1–3.3)
LYMPHOCYTES # BLD AUTO: 15 % — SIGNIFICANT CHANGE UP (ref 13–44)
MCHC RBC-ENTMCNC: 31.7 PG — SIGNIFICANT CHANGE UP (ref 27–34)
MCHC RBC-ENTMCNC: 35.6 GM/DL — SIGNIFICANT CHANGE UP (ref 32–36)
MCV RBC AUTO: 89.1 FL — SIGNIFICANT CHANGE UP (ref 80–100)
MONOCYTES # BLD AUTO: 0.76 K/UL — SIGNIFICANT CHANGE UP (ref 0–0.9)
MONOCYTES NFR BLD AUTO: 8.4 % — SIGNIFICANT CHANGE UP (ref 2–14)
NEUTROPHILS # BLD AUTO: 6.6 K/UL — SIGNIFICANT CHANGE UP (ref 1.8–7.4)
NEUTROPHILS NFR BLD AUTO: 73.3 % — SIGNIFICANT CHANGE UP (ref 43–77)
PLATELET # BLD AUTO: 297 K/UL — SIGNIFICANT CHANGE UP (ref 150–400)
POTASSIUM SERPL-MCNC: 3.8 MMOL/L — SIGNIFICANT CHANGE UP (ref 3.5–5.3)
POTASSIUM SERPL-SCNC: 3.8 MMOL/L — SIGNIFICANT CHANGE UP (ref 3.5–5.3)
PROTHROM AB SERPL-ACNC: 11.3 SEC — SIGNIFICANT CHANGE UP (ref 10.5–13.4)
RBC # BLD: 5.23 M/UL — SIGNIFICANT CHANGE UP (ref 4.2–5.8)
RBC # FLD: 12.6 % — SIGNIFICANT CHANGE UP (ref 10.3–14.5)
SODIUM SERPL-SCNC: 141 MMOL/L — SIGNIFICANT CHANGE UP (ref 135–145)
WBC # BLD: 9.01 K/UL — SIGNIFICANT CHANGE UP (ref 3.8–10.5)
WBC # FLD AUTO: 9.01 K/UL — SIGNIFICANT CHANGE UP (ref 3.8–10.5)

## 2023-07-19 PROCEDURE — 99214 OFFICE O/P EST MOD 30 MIN: CPT | Mod: 25

## 2023-07-19 PROCEDURE — 36415 COLL VENOUS BLD VENIPUNCTURE: CPT

## 2023-07-19 PROCEDURE — 85610 PROTHROMBIN TIME: CPT

## 2023-07-19 PROCEDURE — 85730 THROMBOPLASTIN TIME PARTIAL: CPT

## 2023-07-19 PROCEDURE — 93010 ELECTROCARDIOGRAM REPORT: CPT

## 2023-07-19 PROCEDURE — 86900 BLOOD TYPING SEROLOGIC ABO: CPT

## 2023-07-19 PROCEDURE — 83036 HEMOGLOBIN GLYCOSYLATED A1C: CPT

## 2023-07-19 PROCEDURE — 80048 BASIC METABOLIC PNL TOTAL CA: CPT

## 2023-07-19 PROCEDURE — 85025 COMPLETE CBC W/AUTO DIFF WBC: CPT

## 2023-07-19 PROCEDURE — 93005 ELECTROCARDIOGRAM TRACING: CPT

## 2023-07-19 PROCEDURE — 86901 BLOOD TYPING SEROLOGIC RH(D): CPT

## 2023-07-19 PROCEDURE — 86850 RBC ANTIBODY SCREEN: CPT

## 2023-07-19 RX ORDER — EMPAGLIFLOZIN 10 MG/1
1 TABLET, FILM COATED ORAL
Qty: 0 | Refills: 0 | DISCHARGE

## 2023-07-19 RX ORDER — CELECOXIB 200 MG/1
1 CAPSULE ORAL
Qty: 0 | Refills: 0 | DISCHARGE

## 2023-07-19 RX ORDER — METOPROLOL TARTRATE 50 MG
1 TABLET ORAL
Qty: 0 | Refills: 0 | DISCHARGE

## 2023-07-19 RX ORDER — RIVAROXABAN 15 MG-20MG
1 KIT ORAL
Qty: 0 | Refills: 0 | DISCHARGE

## 2023-07-19 NOTE — H&P PST ADULT - NSICDXPASTSURGICALHX_GEN_ALL_CORE_FT
PAST SURGICAL HISTORY:  History of colonoscopy     History of left knee surgery ACL repair    History of repair of anterior cruciate ligament of right knee     History of umbilical hernia repair     Laceration of left thumb with tendon involvement s/p surgical repair

## 2023-07-19 NOTE — H&P PST ADULT - NSICDXFAMILYHX_GEN_ALL_CORE_FT
FAMILY HISTORY:  Father  Still living? Yes, Estimated age: Age Unknown  Family history of diabetes mellitus, Age at diagnosis: Age Unknown  Family history of heart disease, Age at diagnosis: Age Unknown    Mother  Still living? Yes, Estimated age: Age Unknown  FH: colon cancer, Age at diagnosis: Age Unknown

## 2023-07-19 NOTE — H&P PST ADULT - HISTORY OF PRESENT ILLNESS
61  63 y.o WD, WN male presents to PST with hx of a torn left achilles tendon. Hx significant for HTN, Hyperlipidemia, PAF and DM II. Patient is scheduled for a Surgical Reconstruction Left Achilles Tendon with [facility] Tendon Transfer

## 2023-07-19 NOTE — H&P PST ADULT - ASSESSMENT
63 y.o male scheduled for  63 y.o male scheduled for a  Surgical Reconstruction Left Achilles Tendon with [facility] Tendon Transfer   Plan  1. Stop all NSAIDS, herbal supplements and vitamins for 7 days. Hold Jardiance 3 days prior to surgery   2. NPO at midnight.  3. Take the following medications--none-- with small sips of water on the morning of your procedure/surgery.  4. Use EZ sponges as directed  5. Labs, EKG as per surgeon  6. PMD LARS Lim and Dr Tobias cardiology  visit for optimization prior to surgery as per surgeon  7. Preprocedure education provided

## 2023-07-19 NOTE — H&P PST ADULT - NSICDXPASTMEDICALHX_GEN_ALL_CORE_FT
PAST MEDICAL HISTORY:  2019 novel coronavirus disease (COVID-19) 1/2021  had covid, no intubation, no oxygen, no hospitalization, home quarantine    Achilles tendon tear     H/O melanoma excision     H/O neuropathy     H/O tear of anterior cruciate ligament     Hiatal hernia     History of atrial fibrillation     History of elevated PSA     History of umbilical hernia     HTN (hypertension)     Hyperlipidemia     Type II diabetes mellitus

## 2023-07-20 ENCOUNTER — APPOINTMENT (OUTPATIENT)
Dept: FAMILY MEDICINE | Facility: CLINIC | Age: 63
End: 2023-07-20
Payer: MEDICAID

## 2023-07-20 VITALS
HEART RATE: 61 BPM | DIASTOLIC BLOOD PRESSURE: 81 MMHG | HEIGHT: 72 IN | TEMPERATURE: 98.7 F | RESPIRATION RATE: 15 BRPM | BODY MASS INDEX: 31.56 KG/M2 | WEIGHT: 233 LBS | SYSTOLIC BLOOD PRESSURE: 134 MMHG | OXYGEN SATURATION: 98 %

## 2023-07-20 DIAGNOSIS — S86.002A UNSPECIFIED INJURY OF LEFT ACHILLES TENDON, INITIAL ENCOUNTER: ICD-10-CM

## 2023-07-20 DIAGNOSIS — Z01.818 ENCOUNTER FOR OTHER PREPROCEDURAL EXAMINATION: ICD-10-CM

## 2023-07-20 PROBLEM — Z87.898 PERSONAL HISTORY OF OTHER SPECIFIED CONDITIONS: Chronic | Status: ACTIVE | Noted: 2023-07-19

## 2023-07-20 PROBLEM — Z86.79 PERSONAL HISTORY OF OTHER DISEASES OF THE CIRCULATORY SYSTEM: Chronic | Status: ACTIVE | Noted: 2023-07-19

## 2023-07-20 PROBLEM — Z86.69 PERSONAL HISTORY OF OTHER DISEASES OF THE NERVOUS SYSTEM AND SENSE ORGANS: Chronic | Status: ACTIVE | Noted: 2023-07-19

## 2023-07-20 PROBLEM — E11.9 TYPE 2 DIABETES MELLITUS WITHOUT COMPLICATIONS: Chronic | Status: ACTIVE | Noted: 2023-07-19

## 2023-07-20 PROBLEM — Z87.19 PERSONAL HISTORY OF OTHER DISEASES OF THE DIGESTIVE SYSTEM: Chronic | Status: ACTIVE | Noted: 2023-07-19

## 2023-07-20 PROBLEM — Z87.828 PERSONAL HISTORY OF OTHER (HEALED) PHYSICAL INJURY AND TRAUMA: Chronic | Status: ACTIVE | Noted: 2023-07-19

## 2023-07-20 PROBLEM — S86.019A STRAIN OF UNSPECIFIED ACHILLES TENDON, INITIAL ENCOUNTER: Chronic | Status: ACTIVE | Noted: 2023-07-19

## 2023-07-20 PROBLEM — Z98.890 OTHER SPECIFIED POSTPROCEDURAL STATES: Chronic | Status: ACTIVE | Noted: 2023-07-19

## 2023-07-20 PROBLEM — K44.9 DIAPHRAGMATIC HERNIA WITHOUT OBSTRUCTION OR GANGRENE: Chronic | Status: ACTIVE | Noted: 2023-07-19

## 2023-07-20 PROCEDURE — 99214 OFFICE O/P EST MOD 30 MIN: CPT

## 2023-07-20 NOTE — ASSESSMENT
[Patient Optimized for Surgery] : Patient optimized for surgery [FreeTextEntry4] : Preop labs - hgba1c elevated at 8.3, most recently was down to 7.3 only 3 weeks ago, discussed importance of glycemic control in setting of surgical procedure to help with healing post op. Patient to work on decreasing carbohydrate and sugar intake in diet. Patient to start increased dose of trulicity 4.5mg weekly. Continue jardiance 25mg daily and metformin 1000mg BID\par \par Patient to hold jardiance 3 days prior to procedure and metformin 24 hours prior to procedure. Patient cleared by cardiology to hold aspirin for 7 days.\par \par Patient is intermediate risk for planned procedure. Cardiac clearance obtained from Dr. Rincon.

## 2023-07-20 NOTE — HISTORY OF PRESENT ILLNESS
[Atrial Fibrillation] : atrial fibrillation [Self] : previous adverse anesthesia reaction [(Patient denies any chest pain, claudication, dyspnea on exertion, orthopnea, palpitations or syncope)] : Patient denies any chest pain, claudication, dyspnea on exertion, orthopnea, palpitations or syncope [FreeTextEntry1] : tendon [FreeTextEntry2] : 07/25/2023 [FreeTextEntry4] : 64 y/o male with pmhx of afib, t2dm, hld, htn, bph presents for preop evaluation. Patient is scheduled for a surgical Reconstruction of Left Achilles Tendon with [facility] Tendon Transfer on 7/25/23 with Dr. Low\par  \par Patient was seen by cardiologist for cardiac clearance\par \par Patients preop hgba1c of 8.3, up from hgba1c of 7.3 on 6/29/23. Patient has not yet started increased dose of trulicity as was prescribed at last visit 6/29/23 [FreeTextEntry5] : previous anesthesia reaction woke up during surgery for left knee 1995

## 2023-07-24 LAB
ESTIMATED AVERAGE GLUCOSE: 186 MG/DL
HBA1C MFR BLD HPLC: 8.1 %

## 2023-07-25 ENCOUNTER — TRANSCRIPTION ENCOUNTER (OUTPATIENT)
Age: 63
End: 2023-07-25

## 2023-07-25 ENCOUNTER — APPOINTMENT (OUTPATIENT)
Dept: ORTHOPEDIC SURGERY | Facility: HOSPITAL | Age: 63
End: 2023-07-25

## 2023-07-25 ENCOUNTER — RESULT REVIEW (OUTPATIENT)
Age: 63
End: 2023-07-25

## 2023-07-25 ENCOUNTER — OUTPATIENT (OUTPATIENT)
Dept: INPATIENT UNIT | Facility: HOSPITAL | Age: 63
LOS: 1 days | Discharge: HOME | End: 2023-07-25
Payer: MEDICAID

## 2023-07-25 VITALS
TEMPERATURE: 98 F | SYSTOLIC BLOOD PRESSURE: 133 MMHG | DIASTOLIC BLOOD PRESSURE: 85 MMHG | RESPIRATION RATE: 16 BRPM | HEART RATE: 60 BPM | OXYGEN SATURATION: 97 %

## 2023-07-25 VITALS
HEIGHT: 72 IN | WEIGHT: 235.01 LBS | SYSTOLIC BLOOD PRESSURE: 126 MMHG | TEMPERATURE: 98 F | RESPIRATION RATE: 14 BRPM | DIASTOLIC BLOOD PRESSURE: 86 MMHG | HEART RATE: 65 BPM | OXYGEN SATURATION: 97 %

## 2023-07-25 DIAGNOSIS — Z98.890 OTHER SPECIFIED POSTPROCEDURAL STATES: Chronic | ICD-10-CM

## 2023-07-25 DIAGNOSIS — Z98.89 OTHER SPECIFIED POSTPROCEDURAL STATES: Chronic | ICD-10-CM

## 2023-07-25 DIAGNOSIS — S86.012D STRAIN OF LEFT ACHILLES TENDON, SUBSEQUENT ENCOUNTER: ICD-10-CM

## 2023-07-25 DIAGNOSIS — S86.002A UNSPECIFIED INJURY OF LEFT ACHILLES TENDON, INITIAL ENCOUNTER: ICD-10-CM

## 2023-07-25 DIAGNOSIS — S61.012A LACERATION WITHOUT FOREIGN BODY OF LEFT THUMB WITHOUT DAMAGE TO NAIL, INITIAL ENCOUNTER: Chronic | ICD-10-CM

## 2023-07-25 LAB — GLUCOSE BLDC GLUCOMTR-MCNC: 150 MG/DL — HIGH (ref 70–99)

## 2023-07-25 PROCEDURE — 82962 GLUCOSE BLOOD TEST: CPT

## 2023-07-25 PROCEDURE — 88304 TISSUE EXAM BY PATHOLOGIST: CPT | Mod: 26

## 2023-07-25 PROCEDURE — 88304 TISSUE EXAM BY PATHOLOGIST: CPT

## 2023-07-25 PROCEDURE — 27654 REPAIR OF ACHILLES TENDON: CPT | Mod: LT

## 2023-07-25 RX ORDER — CELECOXIB 200 MG/1
1 CAPSULE ORAL
Refills: 0 | DISCHARGE

## 2023-07-25 RX ORDER — EMPAGLIFLOZIN 10 MG/1
1 TABLET, FILM COATED ORAL
Refills: 0 | DISCHARGE

## 2023-07-25 RX ORDER — ASPIRIN/CALCIUM CARB/MAGNESIUM 324 MG
1 TABLET ORAL
Qty: 30 | Refills: 1
Start: 2023-07-25 | End: 2023-09-22

## 2023-07-25 RX ORDER — ONDANSETRON 8 MG/1
1 TABLET, FILM COATED ORAL
Qty: 40 | Refills: 0
Start: 2023-07-25 | End: 2023-08-03

## 2023-07-25 RX ORDER — ASPIRIN/CALCIUM CARB/MAGNESIUM 324 MG
1 TABLET ORAL
Qty: 60 | Refills: 1
Start: 2023-07-25 | End: 2023-09-22

## 2023-07-25 RX ORDER — DOCUSATE SODIUM 100 MG
1 CAPSULE ORAL
Qty: 14 | Refills: 0
Start: 2023-07-25 | End: 2023-08-07

## 2023-07-25 RX ORDER — FENTANYL CITRATE 50 UG/ML
25 INJECTION INTRAVENOUS
Refills: 0 | Status: DISCONTINUED | OUTPATIENT
Start: 2023-07-25 | End: 2023-07-25

## 2023-07-25 RX ORDER — ATORVASTATIN CALCIUM 80 MG/1
1 TABLET, FILM COATED ORAL
Qty: 0 | Refills: 0 | DISCHARGE

## 2023-07-25 RX ORDER — ASPIRIN/CALCIUM CARB/MAGNESIUM 324 MG
1 TABLET ORAL
Refills: 0 | DISCHARGE

## 2023-07-25 RX ORDER — OXYCODONE HYDROCHLORIDE 5 MG/1
1 TABLET ORAL
Qty: 20 | Refills: 0
Start: 2023-07-25 | End: 2023-07-29

## 2023-07-25 RX ORDER — METFORMIN HYDROCHLORIDE 850 MG/1
1 TABLET ORAL
Qty: 0 | Refills: 0 | DISCHARGE

## 2023-07-25 RX ORDER — METOPROLOL TARTRATE 50 MG
1 TABLET ORAL
Refills: 0 | DISCHARGE

## 2023-07-25 RX ORDER — DULAGLUTIDE 4.5 MG/.5ML
3 INJECTION, SOLUTION SUBCUTANEOUS
Refills: 0 | DISCHARGE

## 2023-07-25 RX ORDER — CHOLECALCIFEROL (VITAMIN D3) 125 MCG
1 CAPSULE ORAL
Qty: 0 | Refills: 0 | DISCHARGE

## 2023-07-25 NOTE — ASU DISCHARGE PLAN (ADULT/PEDIATRIC) - NS MD DC FALL RISK RISK
For information on Fall & Injury Prevention, visit: [email or web address] OR  [email or web address] OR  https://www.cdc.gov/steadi/patient.html

## 2023-07-25 NOTE — ASU DISCHARGE PLAN (ADULT/PEDIATRIC) - CARE PROVIDER_API CALL
Maninder Low  Orthopaedic Surgery  [address] [place] [id number]  Phone: [phone]  Fax: [phone]  Follow Up Time:

## 2023-07-25 NOTE — ASU PATIENT PROFILE, ADULT - FALL HARM RISK - RISK INTERVENTIONS
Assistance OOB with selected safe patient handling equipment/Assistance with ambulation/Communicate Fall Risk and Risk Factors to all staff, patient, and family/Discuss with provider need for PT consult/Monitor gait and stability/Reinforce activity limits and safety measures with patient and family/Visual Cue: Yellow wristband/Bed in lowest position, wheels locked, appropriate side rails in place/Call bell, personal items and telephone in reach/Instruct patient to call for assistance before getting out of bed or chair/Non-slip footwear when patient is out of bed/[place] to call system/Physically safe environment - no spills, clutter or unnecessary equipment/Purposeful Proactive Rounding/Room/bathroom lighting operational, light cord in reach

## 2023-07-25 NOTE — ASU DISCHARGE PLAN (ADULT/PEDIATRIC) - ASU DC SPECIAL INSTRUCTIONSFT
Post-Operative Instructions    PRESCRIPTIONS: your post-operative medications have been handed to you or sent to the pharmacy you indicated at your pre-operative visit.  If you have any difficulty obtaining your post-operative medications or have any questions, please call the office at [phone].      PAIN MANAGEMENT: You should expect to have discomfort for the first week or so after surgery. Pain medication should be taken to help alleviate the pain so that you are comfortable and can participate in physical therapy.  Take the medication as directed.  You may decrease the amount of pain medication, as tolerated, when pain improves.  You must exercise caution when operating a motor vehicle. You have been prescribed one or more of the following as indicated on your Med Rec form thta the Nurse will go over with you:  [ x ] Oxycodone 5mg 1-2 tablets by mouth every 4 to 6 hours as needed for pain  Oxycodone is a short-acting pain medication routinely prescribed after surgery.  It is the pain medication found in “Percocet,” which is a combination of oxycodone and Tylenol.  If you were prescribed oxycodone, you may take Tylenol in addition to this medication, if needed for pain control.  [  ] Oxycontin 10mg by mouth every 12 hours for 5 days  Oxycontin is a long-acting pain medication.  It is sometimes prescribed after longer surgeries. If you were prescribed this medication, you should take it twice a day for the first 5 days after surgery to help with pain control.  [  ] Vicodin or Norco (Hydrocodone 5mg/Tylenol 325mg) 1-2 tablets by mouth every 4-6 hours as needed for pain  Vicodin and Norco are short acting pain medications sometimes prescribed after surgery.  If you were prescribed this medication, you may take 1-2 tablets every 4-6 hours as needed for pain.  This medication already contains Tylenol.  You should NOT take any additional Tylenol (acetaminophen) if you are taking these medications.    NAUSEA: Nausea is a common side effect of anesthesia and pain medications.  You may take the below medication if you are experiencing nausea after surgery.  If you continue to experience nausea or vomiting more than 24 hours after surgery, please call the office.  [ x ] Ondansetron 4mg by mouth every 4 hours as needed for nausea    CONSTIPATION: common side effect of anesthesia and pain medications.  You should take Colace three times daily, as long as you are taking narcotic pain medications after surgery, such as oxycodone, oxycontin, vicodin, or norco.  [ x  ] Colace 100mg by mouth three times daily    DVT PROPHYLAXIS (PREVENTION OF BLOOD CLOTS): Aspirin EC can reduce the risk of blood clots after surgery, particularly after surgery on the legs, ankles and feet.  If you have been prescribed Aspirn, it is essential that you take this medication.  If you already are on an anticoagulant (blood thinner) such as Xarelto, Coumadin, Warfarin, Eliquis - you should resume you home "blood-thinner" in place of the Aspirin.  [ x ] Aspirin 325mg ENTERIC COATED (EC) by mouth once daily for 2-4 weeks (depending on surgical procedure)    ACTIVITY: You should be up and moving as much and as often as possible! Do NOT walk or put bodyweight on your splint or surgical side. Use Crutches or walker. You must keep your bandage/splint dry. Do NOT get it wet. IF you shower it MUST be covered tightly with a garbage bag. Otherwise sponge bath is advised. You do NOT want wet bandages on your skin as they can cause skin breakdown under the splint.    BANDAGE: Your bandage will be chaged in the office. Do NOT remove it yourself. IF it gets damaged or wet (soaked) call. Follow up about 7-10 days in office or as otherwise advised by Dr. Low if different. Post-Operative Instructions    PRESCRIPTIONS: your post-operative medications have been handed to you or sent to the pharmacy you indicated at your pre-operative visit.  If you have any difficulty obtaining your post-operative medications or have any questions, please call the office at [phone].      PAIN MANAGEMENT: You should expect to have discomfort for the first week or so after surgery. Pain medication should be taken to help alleviate the pain so that you are comfortable and can participate in physical therapy.  Take the medication as directed.  You may decrease the amount of pain medication, as tolerated, when pain improves.  You must exercise caution when operating a motor vehicle. You have been prescribed one or more of the following as indicated on your Med Rec form thta the Nurse will go over with you:  [ x ] Oxycodone 5mg 1-2 tablets by mouth every 4 to 6 hours as needed for pain  Oxycodone is a short-acting pain medication routinely prescribed after surgery.  It is the pain medication found in “Percocet,” which is a combination of oxycodone and Tylenol.  If you were prescribed oxycodone, you may take Tylenol in addition to this medication, if needed for pain control.  [  ] Oxycontin 10mg by mouth every 12 hours for 5 days  Oxycontin is a long-acting pain medication.  It is sometimes prescribed after longer surgeries. If you were prescribed this medication, you should take it twice a day for the first 5 days after surgery to help with pain control.  [  ] Vicodin or Norco (Hydrocodone 5mg/Tylenol 325mg) 1-2 tablets by mouth every 4-6 hours as needed for pain  Vicodin and Norco are short acting pain medications sometimes prescribed after surgery.  If you were prescribed this medication, you may take 1-2 tablets every 4-6 hours as needed for pain.  This medication already contains Tylenol.  You should NOT take any additional Tylenol (acetaminophen) if you are taking these medications.    NAUSEA: Nausea is a common side effect of anesthesia and pain medications.  You may take the below medication if you are experiencing nausea after surgery.  If you continue to experience nausea or vomiting more than 24 hours after surgery, please call the office.  [ x ] Ondansetron 4mg by mouth every 4 hours as needed for nausea    CONSTIPATION: common side effect of anesthesia and pain medications.  You should take Colace three times daily, as long as you are taking narcotic pain medications after surgery, such as oxycodone, oxycontin, vicodin, or norco.  [ x  ] Colace 100mg by mouth three times daily    DVT PROPHYLAXIS (PREVENTION OF BLOOD CLOTS): Aspirin EC can reduce the risk of blood clots after surgery, particularly after surgery on the legs, ankles and feet.  If you have been prescribed Aspirn, it is essential that you take this medication.  If you already are on an anticoagulant (blood thinner) such as Xarelto, Coumadin, Warfarin, Eliquis - you should resume you home "blood-thinner" in place of the Aspirin.  [ x ] Aspirin 81mg ENTERIC COATED (EC) by mouth twice daily for 4 weeks (depending on surgical procedure)    ACTIVITY: You should be up and moving as much and as often as possible! Do NOT walk or put bodyweight on your splint or surgical side. Use Crutches or walker. You must keep your bandage/splint dry. Do NOT get it wet. IF you shower it MUST be covered tightly with a garbage bag. Otherwise sponge bath is advised. You do NOT want wet bandages on your skin as they can cause skin breakdown under the splint.    BANDAGE: Your bandage will be chaged in the office. Do NOT remove it yourself. IF it gets damaged or wet (soaked) call. Follow up about 7-10 days in office or as otherwise advised by Dr. Low if different.

## 2023-08-02 DIAGNOSIS — I10 ESSENTIAL (PRIMARY) HYPERTENSION: ICD-10-CM

## 2023-08-02 DIAGNOSIS — I48.91 UNSPECIFIED ATRIAL FIBRILLATION: ICD-10-CM

## 2023-08-02 DIAGNOSIS — M76.62 ACHILLES TENDINITIS, LEFT LEG: ICD-10-CM

## 2023-08-02 DIAGNOSIS — E78.5 HYPERLIPIDEMIA, UNSPECIFIED: ICD-10-CM

## 2023-08-02 DIAGNOSIS — E11.9 TYPE 2 DIABETES MELLITUS WITHOUT COMPLICATIONS: ICD-10-CM

## 2023-08-02 DIAGNOSIS — Z79.82 LONG TERM (CURRENT) USE OF ASPIRIN: ICD-10-CM

## 2023-08-02 DIAGNOSIS — M71.572 OTHER BURSITIS, NOT ELSEWHERE CLASSIFIED, LEFT ANKLE AND FOOT: ICD-10-CM

## 2023-08-02 DIAGNOSIS — Z79.84 LONG TERM (CURRENT) USE OF ORAL HYPOGLYCEMIC DRUGS: ICD-10-CM

## 2023-08-02 DIAGNOSIS — Z87.891 PERSONAL HISTORY OF NICOTINE DEPENDENCE: ICD-10-CM

## 2023-08-02 LAB — SURGICAL PATHOLOGY STUDY: SIGNIFICANT CHANGE UP

## 2023-08-04 NOTE — ASU PATIENT PROFILE, ADULT - ABILITY TO HEAR (WITH HEARING AID OR HEARING APPLIANCE IF NORMALLY USED):
No postpartum complications, patient continues to breastfeed.     - Confirm medications suitable for breastfeeding. Adequate: hears normal conversation without difficulty No postpartum complications, patient continues to breastfeed.     - Confirm medications suitable for breastfeeding.    - Contrast in MRCP: Ferumoxsil is not approved for marketing in the [place] by the U[place]. Food and Drug Administration. No information is available on the clinical use of ferumoxsil during breastfeeding. If ferumoxsil is required by the mother, it is not a reason to discontinue breastfeeding.[1] However, since there is no published experience with ferumoxsil during breastfeeding, other agents may be preferred, especially while nursing a  or  infant.   - Discuss above with patient.

## 2023-08-09 ENCOUNTER — APPOINTMENT (OUTPATIENT)
Dept: ORTHOPEDIC SURGERY | Facility: CLINIC | Age: 63
End: 2023-08-09
Payer: MEDICAID

## 2023-08-09 PROCEDURE — 99024 POSTOP FOLLOW-UP VISIT: CPT

## 2023-08-09 NOTE — HISTORY OF PRESENT ILLNESS
[Clean/Dry/Intact] : clean, dry and intact [Swelling] : swollen [Neuro Intact] : an unremarkable neurological exam [Vascular Intact] : ~T peripheral vascular exam normal [Negative Harvey's] : maneuvers demonstrated a negative Harvey's sign [1] : the patient reports pain that is 1/10 in severity [Doing Well] : is doing well [Excellent Pain Control] : has excellent pain control [No Sign of Infection] : is showing no signs of infection [Chills] : no chills [Fever] : no fever [Nausea] : no nausea [Vomiting] : no vomiting [Healed] : not healed [Erythema] : not erythematous [Discharge] : absent of discharge [Dehiscence] : not dehisced [Sutures Removed] : sutures were not removed [de-identified] : Status post left Achilles tendon debridement with repair 7/25/2023 [de-identified] : The patient is here status post left Achilles tendon debridement with repair. The patient's pain is well controlled and he no longer on pain medications.  Patient is nonweightbearing with a knee scooter. No other complaints.  Patient currently on DVT prophylaxis. His a1C is below 7.  [de-identified] : Physical exam left ankle:  No signs of infection.  Range of motion, special test and strength not performed today. [de-identified] : No imaging performed. [de-identified] : Status post left Achilles tendon debridement with repair 7/25/2023 [de-identified] : Patient given long cam walking boot.  Continue nonweightbearing rules.  Continue with wound care.  Follow-up in 10 days for suture removal and wound check.  Continue with DVT prophylaxis.  Continue with RICE therapy.  All questions answered.

## 2023-08-09 NOTE — ADDENDUM
[FreeTextEntry1] : I, NOLBERTO KAUSHIK, acted solely as a scribe for Dr. Maninder Low on this date 08/09/2023  .   All medical record entries made by the Scribe were at my, Dr. Maninder Low, direction and personally dictated by me on 08/09/2023 . I have reviewed the chart and agree that the record accurately reflects my personal performance of the history, physical exam, assessment and plan. I have also personally directed, reviewed, and agreed with the chart.

## 2023-08-18 ENCOUNTER — APPOINTMENT (OUTPATIENT)
Dept: ORTHOPEDIC SURGERY | Facility: CLINIC | Age: 63
End: 2023-08-18
Payer: MEDICAID

## 2023-08-18 DIAGNOSIS — S86.002A UNSPECIFIED INJURY OF LEFT ACHILLES TENDON, INITIAL ENCOUNTER: ICD-10-CM

## 2023-08-18 PROCEDURE — 99024 POSTOP FOLLOW-UP VISIT: CPT

## 2023-08-18 NOTE — HISTORY OF PRESENT ILLNESS
[1] : the patient reports pain that is 1/10 in severity [Chills] : no chills [Fever] : no fever [Nausea] : no nausea [Vomiting] : no vomiting [Clean/Dry/Intact] : clean, dry and intact [Healed] : not healed [Erythema] : not erythematous [Discharge] : absent of discharge [Swelling] : swollen [Dehiscence] : not dehisced [Neuro Intact] : an unremarkable neurological exam [Vascular Intact] : ~T peripheral vascular exam normal [Negative Harvey's] : maneuvers demonstrated a negative Harvey's sign [Doing Well] : is doing well [Excellent Pain Control] : has excellent pain control [No Sign of Infection] : is showing no signs of infection [Sutures Removed] : sutures were removed [de-identified] : Status post left Achilles tendon debridement with repair 7/25/2023 [de-identified] : The patient is here status post left Achilles tendon debridement with repair. The patient's pain is well controlled and he no longer on pain medications.  Patient is nonweightbearing with a knee scooter. No other complaints.  Patient currently on DVT prophylaxis. His a1C is below 7.  [de-identified] : Physical exam left ankle:  No signs of infection.  Range of motion, special test and strength not performed today. [de-identified] : No imaging performed. [de-identified] : Status post left Achilles tendon debridement with repair 7/25/2023 [de-identified] : Sutures were removed in office today. Continue with wound care. The patient will begin WBAT. The patient was fitted for an ASO brace today in office. A physical therapy prescription was provided for the patient today in office. The wound should not get wet for another 5 days. Continue with DVT prophylaxis.  Continue with RICE therapy.  All questions answered.  The patient will follow up in 4-6 weeks.

## 2023-09-27 ENCOUNTER — APPOINTMENT (OUTPATIENT)
Dept: ORTHOPEDIC SURGERY | Facility: CLINIC | Age: 63
End: 2023-09-27
Payer: MEDICAID

## 2023-09-27 PROCEDURE — 99024 POSTOP FOLLOW-UP VISIT: CPT

## 2023-10-03 ENCOUNTER — APPOINTMENT (OUTPATIENT)
Dept: GASTROENTEROLOGY | Facility: GI CENTER | Age: 63
End: 2023-10-03

## 2023-10-19 ENCOUNTER — APPOINTMENT (OUTPATIENT)
Dept: FAMILY MEDICINE | Facility: CLINIC | Age: 63
End: 2023-10-19
Payer: MEDICAID

## 2023-10-19 VITALS
WEIGHT: 234.4 LBS | SYSTOLIC BLOOD PRESSURE: 130 MMHG | TEMPERATURE: 97.1 F | HEART RATE: 70 BPM | OXYGEN SATURATION: 96 % | RESPIRATION RATE: 14 BRPM | DIASTOLIC BLOOD PRESSURE: 80 MMHG | BODY MASS INDEX: 31.79 KG/M2

## 2023-10-19 LAB — HBA1C MFR BLD HPLC: 6.8

## 2023-10-19 PROCEDURE — 83036 HEMOGLOBIN GLYCOSYLATED A1C: CPT | Mod: QW

## 2023-10-19 PROCEDURE — 99214 OFFICE O/P EST MOD 30 MIN: CPT | Mod: 25

## 2023-10-19 RX ORDER — AMLODIPINE BESYLATE 10 MG/1
10 TABLET ORAL DAILY
Qty: 90 | Refills: 1 | Status: DISCONTINUED | COMMUNITY
Start: 1900-01-01 | End: 2023-10-19

## 2023-12-20 ENCOUNTER — APPOINTMENT (OUTPATIENT)
Dept: ORTHOPEDIC SURGERY | Facility: CLINIC | Age: 63
End: 2023-12-20
Payer: MEDICAID

## 2023-12-20 PROCEDURE — 99213 OFFICE O/P EST LOW 20 MIN: CPT

## 2023-12-20 NOTE — DISCUSSION/SUMMARY
[de-identified] : Today I had a lengthy discussion with the patient regarding their left ankle. I have addressed all the patient's concerns surrounding the pathology of their condition. I'm happy to see the patient doing well. He will continue with physical therapy to be done over a course of 2-3 times a week for a total of 8-12 weeks for his left ankle. An updated prescription was given for the physical therapy today. He will continue with RICE therapy as needed.   As long as the patient has a supportive ski boot, he can return to skiing. I reiterated with the patient that healing from his injury can take 9 months to a year and he still can improve his condition with physical therapy and strengthening.    The patient understood and verbally agreed to the treatment plan. All of their questions were answered, and they were satisfied with the visit. The patient should call the office if they have any questions or experience worsening symptoms.   Follow up in 3-4 months

## 2023-12-20 NOTE — HISTORY OF PRESENT ILLNESS
[FreeTextEntry1] : Status post left Achilles tendon debridement with repair 7/25/2023 12/20/2023: DELLA ADLER is a 63 year old male presenting to the office for a follow up evaluation of his left ankle, s/p left Achilles debridement. He reports that he has been doing well with improved symptoms since his last visit. He reports that he has been doing well and progressing with physical therapy. He would like to inquire about return to skiing for the season. He presents to the office in sneakers and ambulating without assistance.

## 2023-12-20 NOTE — ADDENDUM
[FreeTextEntry1] : I, NOLBERTO KAUSHIK, acted solely as a scribe for Dr. Maninder Low on this date 12/20/2023  .   All medical record entries made by the Scribe were at my, Dr. Maninder Low, direction and personally dictated by me on 12/20/2023 . I have reviewed the chart and agree that the record accurately reflects my personal performance of the history, physical exam, assessment and plan. I have also personally directed, reviewed, and agreed with the chart.

## 2023-12-27 ENCOUNTER — APPOINTMENT (OUTPATIENT)
Dept: FAMILY MEDICINE | Facility: CLINIC | Age: 63
End: 2023-12-27
Payer: MEDICAID

## 2023-12-27 VITALS
SYSTOLIC BLOOD PRESSURE: 160 MMHG | WEIGHT: 234 LBS | HEART RATE: 61 BPM | DIASTOLIC BLOOD PRESSURE: 88 MMHG | OXYGEN SATURATION: 98 % | HEIGHT: 72 IN | BODY MASS INDEX: 31.69 KG/M2

## 2023-12-27 VITALS — DIASTOLIC BLOOD PRESSURE: 82 MMHG | SYSTOLIC BLOOD PRESSURE: 152 MMHG

## 2023-12-27 DIAGNOSIS — H01.00B UNSPECIFIED BLEPHARITIS LEFT EYE, UPPER AND LOWER EYELIDS: ICD-10-CM

## 2023-12-27 PROCEDURE — 99214 OFFICE O/P EST MOD 30 MIN: CPT

## 2023-12-27 NOTE — REVIEW OF SYSTEMS
[Discharge] : no discharge [Redness] : redness [Itching] : itching [FreeTextEntry3] : eyelid swelling of left eye

## 2023-12-27 NOTE — HISTORY OF PRESENT ILLNESS
[Spouse] : spouse [FreeTextEntry1] : 64 yo patient presents to due to redness and swelling of left eye, that started on December 24th.  [de-identified] : Andres presents for acute visit with chief complaint of three days of left eyelid swelling and redness. Denies pain, feels as if there is a piece of sand in the eye. Has not tried anything for the pain.

## 2023-12-27 NOTE — PHYSICAL EXAM
[PERRL] : pupils equal round and reactive to light [EOMI] : extraocular movements intact [Normal] : normal rate, regular rhythm, normal S1 and S2 and no murmur heard [de-identified] : erythema and mild edema of left upper eyelid, small stye noted on upper eyelid

## 2023-12-27 NOTE — ASSESSMENT
[FreeTextEntry1] : Blepharitis- advised frequent warm compresses and moisturizing eye drops. Topical erythrmycin also sent. Return precautions discussed  HTN- elevated today, however pt took his medications late today. Continue current medications

## 2024-01-18 ENCOUNTER — APPOINTMENT (OUTPATIENT)
Dept: FAMILY MEDICINE | Facility: CLINIC | Age: 64
End: 2024-01-18
Payer: MEDICAID

## 2024-01-18 VITALS
HEART RATE: 63 BPM | OXYGEN SATURATION: 98 % | BODY MASS INDEX: 32.03 KG/M2 | RESPIRATION RATE: 14 BRPM | WEIGHT: 236.2 LBS | SYSTOLIC BLOOD PRESSURE: 150 MMHG | TEMPERATURE: 97.2 F | DIASTOLIC BLOOD PRESSURE: 80 MMHG

## 2024-01-18 LAB — HBA1C MFR BLD HPLC: 8.9

## 2024-01-18 PROCEDURE — 99214 OFFICE O/P EST MOD 30 MIN: CPT | Mod: 25

## 2024-01-18 PROCEDURE — 83036 HEMOGLOBIN GLYCOSYLATED A1C: CPT | Mod: QW

## 2024-01-18 RX ORDER — ERYTHROMYCIN 5 MG/G
5 OINTMENT OPHTHALMIC
Qty: 1 | Refills: 0 | Status: DISCONTINUED | COMMUNITY
Start: 2023-12-27 | End: 2024-01-18

## 2024-01-18 NOTE — HISTORY OF PRESENT ILLNESS
[FreeTextEntry1] : Pt is here for three months f/u on DM. [de-identified] : 64 y/o male with pmhx of afib, t2dm, hld, htn, bph presents for follow up.   For type 2 diabetes, most recent hemoglobin A1c of 6.8.  He is currently on Trulicity 4.5mg weekly, jardiance 25mg daily, and metformin 1000mg bid. Patient states he has not been great about his diet recently. Patient also at times forgets to use the trulicity  Patient with a torn Achilles tendon.  He is now s/p surgical repair with Dr. Low, he is healing well. States he was able to ski 3 days last week without issue  For hypertension, currently on metoprolol succinate 100mg daily and valsartan-hctz 320-12.5mg daily. He states he took the medication today

## 2024-01-18 NOTE — ASSESSMENT
[FreeTextEntry1] : HTN: bp elevated at today visit, discussed increasing dose of medication or adding on an additional agent patient would like to wait until his sees his cardiologist tomorrow to make adjustments to his medications continue metoprolol succinate 100 mg daily and valsartan-hydrochlorothiazide 320-12.5 mg daily  Type 2 diabetes will continue Jardiance 25 mg daily, Trulicity 4.5 mg once weekly and metformin 1000 mg twice a day POCT hgba1c today of 8.9 likely due to patients poor diet and inconsistent dosing of trulicity encouraged patient to use trulicity weekly and work on eating a low carb/low sugar diet follow up in 3 months  HLD: -continue atorvastatin 10 mg daily. -will check cmp and lipid panel  Achilles tendinopathy - now s/p surgical repair healing well, continue to follow up with Dr. Low

## 2024-01-19 LAB
ALBUMIN SERPL ELPH-MCNC: 4.6 G/DL
ALP BLD-CCNC: 86 U/L
ALT SERPL-CCNC: 24 U/L
ANION GAP SERPL CALC-SCNC: 12 MMOL/L
AST SERPL-CCNC: 20 U/L
BASOPHILS # BLD AUTO: 0.06 K/UL
BASOPHILS NFR BLD AUTO: 0.7 %
BILIRUB SERPL-MCNC: 0.4 MG/DL
BUN SERPL-MCNC: 9 MG/DL
CALCIUM SERPL-MCNC: 9.4 MG/DL
CHLORIDE SERPL-SCNC: 105 MMOL/L
CHOLEST SERPL-MCNC: 156 MG/DL
CO2 SERPL-SCNC: 23 MMOL/L
CREAT SERPL-MCNC: 0.74 MG/DL
EGFR: 102 ML/MIN/1.73M2
EOSINOPHIL # BLD AUTO: 0.24 K/UL
EOSINOPHIL NFR BLD AUTO: 2.9 %
GLUCOSE SERPL-MCNC: 150 MG/DL
HCT VFR BLD CALC: 50.3 %
HDLC SERPL-MCNC: 42 MG/DL
HGB BLD-MCNC: 16.8 G/DL
IMM GRANULOCYTES NFR BLD AUTO: 0.1 %
LDLC SERPL CALC-MCNC: 95 MG/DL
LYMPHOCYTES # BLD AUTO: 1.6 K/UL
LYMPHOCYTES NFR BLD AUTO: 19.5 %
MAN DIFF?: NORMAL
MCHC RBC-ENTMCNC: 31.3 PG
MCHC RBC-ENTMCNC: 33.4 GM/DL
MCV RBC AUTO: 93.8 FL
MONOCYTES # BLD AUTO: 0.75 K/UL
MONOCYTES NFR BLD AUTO: 9.1 %
NEUTROPHILS # BLD AUTO: 5.54 K/UL
NEUTROPHILS NFR BLD AUTO: 67.7 %
NONHDLC SERPL-MCNC: 114 MG/DL
PLATELET # BLD AUTO: 318 K/UL
POTASSIUM SERPL-SCNC: 4.3 MMOL/L
PROT SERPL-MCNC: 6.6 G/DL
RBC # BLD: 5.36 M/UL
RBC # FLD: 13.2 %
SODIUM SERPL-SCNC: 140 MMOL/L
TRIGL SERPL-MCNC: 106 MG/DL
WBC # FLD AUTO: 8.2 K/UL

## 2024-03-06 ENCOUNTER — APPOINTMENT (OUTPATIENT)
Dept: FAMILY MEDICINE | Facility: CLINIC | Age: 64
End: 2024-03-06
Payer: MEDICAID

## 2024-03-06 VITALS
TEMPERATURE: 97.2 F | RESPIRATION RATE: 16 BRPM | DIASTOLIC BLOOD PRESSURE: 92 MMHG | OXYGEN SATURATION: 98 % | SYSTOLIC BLOOD PRESSURE: 146 MMHG | HEART RATE: 71 BPM

## 2024-03-06 DIAGNOSIS — M54.9 DORSALGIA, UNSPECIFIED: ICD-10-CM

## 2024-03-06 PROCEDURE — 99214 OFFICE O/P EST MOD 30 MIN: CPT

## 2024-03-06 NOTE — ASSESSMENT
[FreeTextEntry1] : Dorsalgia of lumbar region-discussed conservative treatment including core strengthening, exercises, topical analgesics as he is already taking anti-inflammatory.  Home exercise program given today, if no improvement, discussed formal PT  Hypertension-has been elevated above goal.  Will have him increase valsartan-hydrochlorothiazide to previous dose of 320-25 mg daily and have him monitor his home blood pressure to bring log to his follow-up in 1 month.  Continue with metoprolol and healthy lifestyle changes  Diabetes-last A1c elevated at 8.9, continue current regimen and keep follow-up in 1 month with further adjustments to be made as indicated

## 2024-03-06 NOTE — HISTORY OF PRESENT ILLNESS
[FreeTextEntry1] : Pt is here to discuss about lower back pain. [de-identified] : Andres is a 64-year-old male with past medical history of atrial fibrillation, diabetes, hyperlipidemia, hypertension who presents today for acute visit with chief complaint of left-sided low back pain that has worsened over the last month.  He reports that the pain varies in severity.  It is aggravated when he sits but improved when he is active such as walking, skiing, gardening.  Is also improved if he adds lumbar support when he is sitting.  After he stands up, he feels stiff for a minute or 2 and does some stretches to help loosen the back which helps.  He denies any numbness or weakness of the legs, no bowel or bladder incontinence.  He currently takes Celebrex 4 days/week and gabapentin for neuropathy.  For hypertension, he currently takes metoprolol  mg daily and valsartan-hydrochlorothiazide 320-12.5 mg daily.  He was previously also taking amlodipine.  In the past he was on higher dose of hydrochlorothiazide and still has the valsartan-hydrochlorothiazide 320-25 mg dose at home.  He is in need of refill of his diabetes medications, currently is taking Trulicity 4.5 mg weekly, metformin 1000 mg twice daily, and Jardiance 25 mg daily

## 2024-03-06 NOTE — PHYSICAL EXAM
[No Acute Distress] : no acute distress [Normal] : normal rate, regular rhythm, normal S1 and S2 and no murmur heard [de-identified] : Mild tenderness to left lumbar paraspinal muscles, normal range of motion

## 2024-03-20 ENCOUNTER — APPOINTMENT (OUTPATIENT)
Dept: ORTHOPEDIC SURGERY | Facility: CLINIC | Age: 64
End: 2024-03-20
Payer: MEDICAID

## 2024-03-20 DIAGNOSIS — M76.62 ACHILLES TENDINITIS, LEFT LEG: ICD-10-CM

## 2024-03-20 PROCEDURE — 99213 OFFICE O/P EST LOW 20 MIN: CPT

## 2024-03-20 NOTE — ADDENDUM
[FreeTextEntry1] : I, Maninder Sims, acted solely as a scribe for Dr. Maninder Low on this date 03/20/2024  .   All medical record entries made by the Scribe were at my, Dr. Maninder Low, direction and personally dictated by me on 03/20/2024 . I have reviewed the chart and agree that the record accurately reflects my personal performance of the history, physical exam, assessment and plan. I have also personally directed, reviewed, and agreed with the chart.

## 2024-03-20 NOTE — HISTORY OF PRESENT ILLNESS
[FreeTextEntry1] :  03/20/2024: DELLA ADLER is a 64 year old male presenting to the office for a follow up evaluation of  his left ankle, s/p left Achilles debridement. He reports that his symptoms have improved since his last visit to the office.  The patient presents to the office in sneakers and ambulating without assistance.   Status post left Achilles tendon debridement with repair 7/25/2023 12/20/2023: DELLA ADLER is a 63 year old male presenting to the office for a follow up evaluation of his left ankle, s/p left Achilles debridement. He reports that he has been doing well with improved symptoms since his last visit. He reports that he has been doing well and progressing with physical therapy. He would like to inquire about return to skiing for the season. He presents to the office in sneakers and ambulating without assistance.

## 2024-03-20 NOTE — DISCUSSION/SUMMARY
[de-identified] : Today I had a lengthy discussion with the patient regarding their left ankle. I have addressed all the patient's concerns surrounding the pathology of their condition. patient has healed very well after the surgery.   I recommend that the patient utilize ice, NSAIDS PRN, and heat. They can also elevate their left ankle above the level of the heart.   The patient understood and verbally agreed to the treatment plan. All of their questions were answered, and they were satisfied with the visit. The patient should call the office if they have any questions or experience worsening symptoms.   Follow up if needed.

## 2024-03-20 NOTE — PHYSICAL EXAM
[de-identified] : General: Alert and oriented x3. In no acute distress. Pleasant in nature with a normal affect. No apparent respiratory distress.  (L) LEFT Ankle Exam  + Zavaleta intact  + incision healed   Skin: Clean, dry, intact Inspection: No obvious malalignment, no swelling, no effusion; no lymphadenopathy Pulses: 2+ DP/PT pulses ROM: LEFT Ankle 10 degrees of dorsiflexion, 40 degrees of plantarflexion, 10 degrees of subtalar motion Tenderness: No tenderness over the lateral malleolus, no CFL/ATFL/PTFL pain. No medial malleolus pain, no deltoid ligament pain. No proximal fibular pain. No heel pain. Stability: Negative anterior/posterior drawer. Strength: 5/5 TA/GS/EHL Neuro: In tact to light touch throughout Additional tests: Negative Mortons test, Negative syndesmosis squeeze test.  [de-identified] : No new imaging.

## 2024-04-01 ENCOUNTER — APPOINTMENT (OUTPATIENT)
Dept: GASTROENTEROLOGY | Facility: CLINIC | Age: 64
End: 2024-04-01
Payer: MEDICAID

## 2024-04-01 VITALS — HEIGHT: 72 IN | BODY MASS INDEX: 31.97 KG/M2 | WEIGHT: 236 LBS

## 2024-04-01 PROCEDURE — 99442: CPT

## 2024-04-01 NOTE — ASSESSMENT
[FreeTextEntry1] : The patient will be scheduled for a colonoscopy. The bowel preparation was discussed at length. Risks (including bleeding, pain, perforation, incomplete examination, splenic laceration, adverse reactions to medications, aspiration and death), benefits and alternatives were discussed. Patient is agreeable for the colonoscopy. The patient is medically optimized for the procedure. We will schedule the patient for the procedure. Bowel preparation was sent to the pharmacy. I spent 15 minutes on the encounter Ezio Gonzalez MD Gastroenterology

## 2024-04-01 NOTE — HISTORY OF PRESENT ILLNESS
[FreeTextEntry1] : Telephonic visit was scheduled. Verbal consent was obtained from the patient.Patient was at home and I was at [address] [place] [address]. Evaluated with a colonoscopy in the past and 1 year follow-up was recommendedThe patient has been due to the inadequate bowel preparation in the transverse colon.  He had multiple colon polyps removed.  He has no complaints.

## 2024-04-05 RX ORDER — POLYETHYLENE GLYCOL-3350 AND ELECTROLYTES WITH FLAVOR PACK 240; 5.84; 2.98; 6.72; 22.72 G/278.26G; G/278.26G; G/278.26G; G/278.26G; G/278.26G
240 POWDER, FOR SOLUTION ORAL
Qty: 1 | Refills: 0 | Status: ACTIVE | COMMUNITY
Start: 2023-08-27 | End: 1900-01-01

## 2024-04-14 ENCOUNTER — TRANSCRIPTION ENCOUNTER (OUTPATIENT)
Age: 64
End: 2024-04-14

## 2024-04-14 PROBLEM — Z86.010 HISTORY OF COLON POLYPS: Status: ACTIVE | Noted: 2022-11-29

## 2024-04-14 NOTE — PHYSICAL EXAM
[Alert] : alert [Normal Voice/Communication] : normal voice/communication [Healthy Appearing] : healthy appearing [No Acute Distress] : no acute distress [Sclera] : the sclera and conjunctiva were normal [Hearing Threshold Finger Rub Not [place]] : hearing was normal [Normal Lips/Gums] : the lips and gums were normal [Oropharynx] : the oropharynx was normal [Normal Appearance] : the appearance of the neck was normal [No Neck Mass] : no neck mass was observed [No Respiratory Distress] : no respiratory distress [No Acc Muscle Use] : no accessory muscle use [Respiration, Rhythm And Depth] : normal respiratory rhythm and effort [Auscultation Breath Sounds / Voice Sounds] : lungs were clear to auscultation bilaterally [Heart Rate And Rhythm] : heart rate was normal and rhythm regular [Normal S1, S2] : normal S1 and S2 [Murmurs] : no murmurs [Abdomen Tenderness] : non-tender [Bowel Sounds] : normal bowel sounds [No Masses] : no abdominal mass palpated [Abdomen Soft] : soft [] : no hepatosplenomegaly [Oriented To Time, Place, And Person] : oriented to person, place, and time

## 2024-04-15 ENCOUNTER — RESULT REVIEW (OUTPATIENT)
Age: 64
End: 2024-04-15

## 2024-04-15 ENCOUNTER — APPOINTMENT (OUTPATIENT)
Dept: GASTROENTEROLOGY | Facility: GI CENTER | Age: 64
End: 2024-04-15
Payer: MEDICAID

## 2024-04-15 ENCOUNTER — OUTPATIENT (OUTPATIENT)
Dept: OUTPATIENT SERVICES | Facility: HOSPITAL | Age: 64
LOS: 1 days | End: 2024-04-15
Payer: COMMERCIAL

## 2024-04-15 DIAGNOSIS — Z86.010 PERSONAL HISTORY OF COLONIC POLYPS: ICD-10-CM

## 2024-04-15 DIAGNOSIS — Z98.890 OTHER SPECIFIED POSTPROCEDURAL STATES: Chronic | ICD-10-CM

## 2024-04-15 DIAGNOSIS — K57.30 DIVERTICULOSIS OF LARGE INTESTINE W/OUT PERFORATION OR ABSCESS W/OUT BLEEDING: ICD-10-CM

## 2024-04-15 DIAGNOSIS — Z98.89 OTHER SPECIFIED POSTPROCEDURAL STATES: Chronic | ICD-10-CM

## 2024-04-15 DIAGNOSIS — K63.5 POLYP OF COLON: ICD-10-CM

## 2024-04-15 DIAGNOSIS — S61.012A LACERATION WITHOUT FOREIGN BODY OF LEFT THUMB WITHOUT DAMAGE TO NAIL, INITIAL ENCOUNTER: Chronic | ICD-10-CM

## 2024-04-15 LAB — GLUCOSE BLDC GLUCOMTR-MCNC: 138 MG/DL — HIGH (ref 70–99)

## 2024-04-15 PROCEDURE — 45385 COLONOSCOPY W/LESION REMOVAL: CPT

## 2024-04-15 PROCEDURE — 45385 COLONOSCOPY W/LESION REMOVAL: CPT | Mod: 33

## 2024-04-15 PROCEDURE — 45381 COLONOSCOPY SUBMUCOUS NJX: CPT | Mod: 59,33

## 2024-04-15 PROCEDURE — 82962 GLUCOSE BLOOD TEST: CPT

## 2024-04-15 PROCEDURE — 45380 COLONOSCOPY AND BIOPSY: CPT | Mod: 59,33

## 2024-04-15 PROCEDURE — 45380 COLONOSCOPY AND BIOPSY: CPT | Mod: XS

## 2024-04-15 PROCEDURE — 88305 TISSUE EXAM BY PATHOLOGIST: CPT

## 2024-04-15 PROCEDURE — 88305 TISSUE EXAM BY PATHOLOGIST: CPT | Mod: 26

## 2024-04-15 NOTE — HISTORY OF PRESENT ILLNESS
[FreeTextEntry1] : Evaluated with a colonoscopy in the past and 1 year follow-up was recommended due to the inadequate bowel preparation in the transverse colon.  He had multiple colon polyps removed.  He has no complaints.

## 2024-04-17 LAB — SURGICAL PATHOLOGY STUDY: SIGNIFICANT CHANGE UP

## 2024-04-18 ENCOUNTER — NON-APPOINTMENT (OUTPATIENT)
Age: 64
End: 2024-04-18

## 2024-04-25 ENCOUNTER — APPOINTMENT (OUTPATIENT)
Dept: INTERNAL MEDICINE | Facility: CLINIC | Age: 64
End: 2024-04-25
Payer: MEDICAID

## 2024-04-25 VITALS
WEIGHT: 225 LBS | HEIGHT: 72 IN | DIASTOLIC BLOOD PRESSURE: 80 MMHG | SYSTOLIC BLOOD PRESSURE: 130 MMHG | BODY MASS INDEX: 30.48 KG/M2

## 2024-04-25 DIAGNOSIS — I10 ESSENTIAL (PRIMARY) HYPERTENSION: ICD-10-CM

## 2024-04-25 DIAGNOSIS — E11.9 TYPE 2 DIABETES MELLITUS W/OUT COMPLICATIONS: ICD-10-CM

## 2024-04-25 DIAGNOSIS — M54.50 LOW BACK PAIN, UNSPECIFIED: ICD-10-CM

## 2024-04-25 DIAGNOSIS — E78.5 HYPERLIPIDEMIA, UNSPECIFIED: ICD-10-CM

## 2024-04-25 PROCEDURE — 99214 OFFICE O/P EST MOD 30 MIN: CPT | Mod: 25

## 2024-04-25 RX ORDER — METOPROLOL SUCCINATE 100 MG/1
100 TABLET, EXTENDED RELEASE ORAL
Qty: 90 | Refills: 1 | Status: ACTIVE | COMMUNITY
Start: 2020-06-03 | End: 1900-01-01

## 2024-04-25 RX ORDER — EMPAGLIFLOZIN 25 MG/1
25 TABLET, FILM COATED ORAL DAILY
Qty: 90 | Refills: 1 | Status: ACTIVE | COMMUNITY
Start: 1900-01-01 | End: 1900-01-01

## 2024-04-25 RX ORDER — CELECOXIB 200 MG/1
200 CAPSULE ORAL DAILY
Qty: 90 | Refills: 1 | Status: ACTIVE | COMMUNITY
Start: 2021-04-13 | End: 1900-01-01

## 2024-04-25 RX ORDER — ASPIRIN ENTERIC COATED TABLETS 81 MG 81 MG/1
81 TABLET, DELAYED RELEASE ORAL DAILY
Qty: 90 | Refills: 1 | Status: ACTIVE | COMMUNITY
Start: 2022-03-17 | End: 1900-01-01

## 2024-04-25 RX ORDER — DULAGLUTIDE 4.5 MG/.5ML
4.5 INJECTION, SOLUTION SUBCUTANEOUS
Qty: 12 | Refills: 1 | Status: ACTIVE | COMMUNITY
Start: 1900-01-01 | End: 1900-01-01

## 2024-04-25 RX ORDER — ATORVASTATIN CALCIUM 10 MG/1
10 TABLET, FILM COATED ORAL
Qty: 90 | Refills: 1 | Status: ACTIVE | COMMUNITY
Start: 2019-08-13 | End: 1900-01-01

## 2024-04-25 RX ORDER — METFORMIN HYDROCHLORIDE 1000 MG/1
1000 TABLET, COATED ORAL
Qty: 180 | Refills: 1 | Status: ACTIVE | COMMUNITY
Start: 1900-01-01 | End: 1900-01-01

## 2024-04-25 RX ORDER — VALSARTAN AND HYDROCHLOROTHIAZIDE 320; 25 MG/1; MG/1
320-25 TABLET, FILM COATED ORAL DAILY
Qty: 90 | Refills: 1 | Status: ACTIVE | COMMUNITY
Start: 2021-05-26 | End: 1900-01-01

## 2024-04-25 RX ORDER — GABAPENTIN 300 MG/1
300 CAPSULE ORAL 3 TIMES DAILY
Qty: 90 | Refills: 1 | Status: ACTIVE | COMMUNITY
Start: 2023-03-22 | End: 1900-01-01

## 2024-04-25 NOTE — ASSESSMENT
[FreeTextEntry1] : HTN: bp stable continue metoprolol succinate 100 mg daily and valsartan-hydrochlorothiazide 320-25 mg daily  Type 2 diabetes will continue Jardiance 25 mg daily, Trulicity 4.5 mg once weekly and metformin 1000 mg twice a day will check hgba1c today and albumin/creatinine ratio due to see opthalologist following with podiatrist follow up in 3 months  HLD: -continue atorvastatin 10 mg daily. most recent lipid panel stable  Lower back pain continue celebrex as needed will refer to PT

## 2024-04-25 NOTE — HISTORY OF PRESENT ILLNESS
[de-identified] : 63 y/o male with pmhx of afib, t2dm, hld, htn, bph presents for follow up.   For type 2 diabetes, most recent hemoglobin A1c of 6.8.  He is currently on Trulicity 4.5mg weekly, jardiance 25mg daily, and metformin 1000mg bid. Patient states he has not been great about his diet recently. Patient also at times forgets to use the trulicity  For hypertension, currently on metoprolol succinate 100mg daily and valsartan-hctz 320-25mg daily. He states he took the medication today  Patient had screening colonoscopy, had two polyps which were benign. He will be seeing his cardiologist soon for a screening cartoid doppler

## 2024-04-29 LAB
ALBUMIN SERPL ELPH-MCNC: 4.8 G/DL
ALP BLD-CCNC: 88 U/L
ALT SERPL-CCNC: 33 U/L
ANION GAP SERPL CALC-SCNC: 13 MMOL/L
AST SERPL-CCNC: 24 U/L
BILIRUB SERPL-MCNC: 0.6 MG/DL
BUN SERPL-MCNC: 9 MG/DL
CALCIUM SERPL-MCNC: 9.6 MG/DL
CHLORIDE SERPL-SCNC: 103 MMOL/L
CO2 SERPL-SCNC: 25 MMOL/L
CREAT SERPL-MCNC: 0.8 MG/DL
CREAT SPEC-SCNC: 80 MG/DL
EGFR: 100 ML/MIN/1.73M2
ESTIMATED AVERAGE GLUCOSE: 154 MG/DL
GLUCOSE SERPL-MCNC: 148 MG/DL
HBA1C MFR BLD HPLC: 7 %
MICROALBUMIN 24H UR DL<=1MG/L-MCNC: <1.2 MG/DL
MICROALBUMIN/CREAT 24H UR-RTO: NORMAL MG/G
POTASSIUM SERPL-SCNC: 4.1 MMOL/L
PROT SERPL-MCNC: 6.6 G/DL
SODIUM SERPL-SCNC: 141 MMOL/L

## 2024-05-05 ENCOUNTER — NON-APPOINTMENT (OUTPATIENT)
Age: 64
End: 2024-05-05

## 2024-07-29 ENCOUNTER — APPOINTMENT (OUTPATIENT)
Dept: ORTHOPEDIC SURGERY | Facility: CLINIC | Age: 64
End: 2024-07-29
Payer: COMMERCIAL

## 2024-07-29 DIAGNOSIS — S99.912D UNSPECIFIED INJURY OF LEFT ANKLE, SUBSEQUENT ENCOUNTER: ICD-10-CM

## 2024-07-29 DIAGNOSIS — M76.62 ACHILLES TENDINITIS, LEFT LEG: ICD-10-CM

## 2024-07-29 DIAGNOSIS — M67.88 OTHER SPECIFIED DISORDERS OF SYNOVIUM AND TENDON, OTHER SITE: ICD-10-CM

## 2024-07-29 PROCEDURE — 99213 OFFICE O/P EST LOW 20 MIN: CPT

## 2024-07-29 NOTE — HISTORY OF PRESENT ILLNESS
[FreeTextEntry1] : 07/29/2024: DELLA ADLER is a 64 year old male presenting to the office s/p left Achilles tendon debridement with repair 7/25/2023. 6 weeks ago he was walking in his driveway after being ambulatory for a prolonged period of time. He suddenly felt an acute pain and "pop" in the lateral aspect of his foot, new pain. Since then, the pain has radiated to the lateral ankle. The patient presents to the office in dress shoes and ambulating without assistance.   03/20/2024: DELLA ADLER is a 64 year old male presenting to the office for a follow up evaluation of  his left ankle, s/p left Achilles debridement. He reports that his symptoms have improved since his last visit to the office.  The patient presents to the office in sneakers and ambulating without assistance.   Status post left Achilles tendon debridement with repair 7/25/2023 12/20/2023: DELLA ADLER is a 63 year old male presenting to the office for a follow up evaluation of his left ankle, s/p left Achilles debridement. He reports that he has been doing well with improved symptoms since his last visit. He reports that he has been doing well and progressing with physical therapy. He would like to inquire about return to skiing for the season. He presents to the office in sneakers and ambulating without assistance.

## 2024-07-29 NOTE — ADDENDUM
[FreeTextEntry1] : I, Sarath Mookie, acted solely as a scribe for Dr. Maninder Low on this date 07/29/2024.   All medical record entries made by the Scribe were at my, Dr. Maninder Low, direction and personally dictated by me on 07/29/2024. I have reviewed the chart and agree that the record accurately reflects my personal performance of the history, physical exam, assessment and plan. I have also personally directed, reviewed, and agreed with the chart.

## 2024-07-29 NOTE — PHYSICAL EXAM
[de-identified] : Left ankle Physical Examination:   General: Alert and oriented x3.  In no acute distress.  Pleasant in nature with a normal affect.  No apparent respiratory distress. Erythema, Warmth, Rubor: Negative Swelling: Negative  + Zavaleta intact  + incision healed    ROM: 1. Dorsiflexion: 10 degrees 2. Plantarflexion: 40 degrees 3. 10 degrees of subtalar motion 4. Inversion: 20 degrees 5. Eversion: 20 degrees   Tenderness to Palpation: 1. Lateral Malleolus: Negative 2. Medial Malleolus: Negative 3. Proximal Fibular Pain: Negative 4. Heel Pain: Negative 5. Cuboid: Negative 6. Navicular: Negative 7. Tibiotalar Joint: Negative 8. Subtalar Joint: Negative 9. Posterior Recess: Negative   Tendon Pain: 1. Achilles: Negative 2. Peroneals: +, posterior 3. Posterior Tibialis: Negative 4. Tibialis Anterior: Negative   Ligament Pain: 1. ATFL: Negative 2. CFL: Negative 3. PTFL: Negative 4. Deltoid Ligaments: Negative 5. Lis Franc Ligament: Negative   Stability: 1. Anterior Drawer: Negative 2. Posterior Drawer: Negative   Strength: 5/5 TA/GS/EHL   Pulses: 2+ DP/PT Pulses   Neuro: Intact motor and sensory   Additional Test: 1. Calcaneal Squeeze Test: Negative 2. Syndesmosis Squeeze Test: Negative 3. Zavaleta Test: Negative  [de-identified] : No new images performed today.

## 2024-07-29 NOTE — DISCUSSION/SUMMARY
[de-identified] : Today I had a lengthy discussion with the patient regarding their left ankle pain. I have addressed all the patient's concerns surrounding the pathology of their condition. The patient is doing well from his surgery, but for his new ankle pain, I would like the patient to pursue conservative management, which was discussed in great detail.    Plan:  1.  I recommend the patient undergo a course of physical therapy for the left ankle  2-3 times a week for a total of 8-12 weeks. A prescription was given for the physical therapy today. 2. A discussion was had about shoe-wear modifications. I advised the patient to utilize a wide toed cross training sneaker that better accommodates the feet. I recommended New Balance, [place], [employer]y, or Hoka to the patient. 3. I recommend that the patient utilize ice, NSAIDS/Tylenol PRN, and heat.  4.  I recommend that the patient utilize Voltaren gel topically. If the Voltaren gel could not be obtained, Icy Hot, Biofreeze, or Bengay can be utilized instead.  f/u in 6-8 weeks prn.   The patient understood and verbally agreed to the treatment plan. All of their questions were answered, and they were satisfied with the visit. The patient should call the office if they have any questions or experience worsening symptoms.

## 2024-07-30 ENCOUNTER — APPOINTMENT (OUTPATIENT)
Dept: INTERNAL MEDICINE | Facility: CLINIC | Age: 64
End: 2024-07-30
Payer: COMMERCIAL

## 2024-07-30 VITALS
WEIGHT: 222 LBS | HEIGHT: 72 IN | OXYGEN SATURATION: 92 % | DIASTOLIC BLOOD PRESSURE: 86 MMHG | HEART RATE: 63 BPM | BODY MASS INDEX: 30.07 KG/M2 | SYSTOLIC BLOOD PRESSURE: 132 MMHG

## 2024-07-30 DIAGNOSIS — I10 ESSENTIAL (PRIMARY) HYPERTENSION: ICD-10-CM

## 2024-07-30 DIAGNOSIS — E78.5 HYPERLIPIDEMIA, UNSPECIFIED: ICD-10-CM

## 2024-07-30 DIAGNOSIS — E11.9 TYPE 2 DIABETES MELLITUS W/OUT COMPLICATIONS: ICD-10-CM

## 2024-07-30 PROCEDURE — 99214 OFFICE O/P EST MOD 30 MIN: CPT | Mod: 25

## 2024-07-30 NOTE — HISTORY OF PRESENT ILLNESS
[de-identified] : 63 y/o male with pmhx of afib, t2dm, hld, htn, bph presents for follow up.   For type 2 diabetes, He is currently on Trulicity 4.5mg weekly, jardiance 25mg daily, and metformin 1000mg bid. Patient also at times forgets to use the trulicity  For hypertension, currently on metoprolol succinate 100mg daily and valsartan-hctz 320-25mg daily. He states he took the medication today

## 2024-07-30 NOTE — ASSESSMENT
[FreeTextEntry1] : HTN: bp stable continue metoprolol succinate 100 mg daily and valsartan-hydrochlorothiazide 320-25 mg daily  Type 2 diabetes will continue Jardiance 25 mg daily, Trulicity 4.5 mg once weekly and metformin 1000 mg twice a day will check [id number] today albumin/creatinine ratio normal - 4/2024 due to see opthalologist following with podiatrist follow up in 3 months  HLD: -continue atorvastatin 10 mg daily. most recent lipid panel stable, will repeat today

## 2024-07-31 LAB
ALBUMIN SERPL ELPH-MCNC: 4.6 G/DL
ALP BLD-CCNC: 86 U/L
ALT SERPL-CCNC: 22 U/L
ANION GAP SERPL CALC-SCNC: 14 MMOL/L
AST SERPL-CCNC: 19 U/L
BASOPHILS # BLD AUTO: 0.06 K/UL
BASOPHILS NFR BLD AUTO: 0.7 %
BILIRUB SERPL-MCNC: 0.4 MG/DL
BUN SERPL-MCNC: 14 MG/DL
CALCIUM SERPL-MCNC: 9.5 MG/DL
CHLORIDE SERPL-SCNC: 102 MMOL/L
CHOLEST SERPL-MCNC: 149 MG/DL
CO2 SERPL-SCNC: 24 MMOL/L
CREAT SERPL-MCNC: 0.78 MG/DL
EGFR: 101 ML/MIN/1.73M2
EOSINOPHIL # BLD AUTO: 0.22 K/UL
EOSINOPHIL NFR BLD AUTO: 2.7 %
ESTIMATED AVERAGE GLUCOSE: 163 MG/DL
GLUCOSE SERPL-MCNC: 136 MG/DL
HBA1C MFR BLD HPLC: 7.3 %
HCT VFR BLD CALC: 49.3 %
HDLC SERPL-MCNC: 43 MG/DL
HGB BLD-MCNC: 16.2 G/DL
IMM GRANULOCYTES NFR BLD AUTO: 0.4 %
LDLC SERPL CALC-MCNC: 77 MG/DL
LYMPHOCYTES # BLD AUTO: 1.86 K/UL
LYMPHOCYTES NFR BLD AUTO: 22.6 %
MAN DIFF?: NORMAL
MCHC RBC-ENTMCNC: 31.2 PG
MCHC RBC-ENTMCNC: 32.9 GM/DL
MCV RBC AUTO: 94.8 FL
MONOCYTES # BLD AUTO: 0.7 K/UL
MONOCYTES NFR BLD AUTO: 8.5 %
NEUTROPHILS # BLD AUTO: 5.37 K/UL
NEUTROPHILS NFR BLD AUTO: 65.1 %
NONHDLC SERPL-MCNC: 105 MG/DL
PLATELET # BLD AUTO: 309 K/UL
POTASSIUM SERPL-SCNC: 4.2 MMOL/L
PROT SERPL-MCNC: 6.6 G/DL
RBC # BLD: 5.2 M/UL
RBC # FLD: 13.6 %
SODIUM SERPL-SCNC: 139 MMOL/L
TRIGL SERPL-MCNC: 165 MG/DL
WBC # FLD AUTO: 8.24 K/UL

## 2024-09-09 ENCOUNTER — APPOINTMENT (OUTPATIENT)
Dept: ORTHOPEDIC SURGERY | Facility: CLINIC | Age: 64
End: 2024-09-09

## 2024-10-29 ENCOUNTER — APPOINTMENT (OUTPATIENT)
Dept: INTERNAL MEDICINE | Facility: CLINIC | Age: 64
End: 2024-10-29
Payer: COMMERCIAL

## 2024-10-29 VITALS
WEIGHT: 225 LBS | HEART RATE: 84 BPM | HEIGHT: 72 IN | BODY MASS INDEX: 30.48 KG/M2 | SYSTOLIC BLOOD PRESSURE: 169 MMHG | DIASTOLIC BLOOD PRESSURE: 92 MMHG

## 2024-10-29 DIAGNOSIS — I10 ESSENTIAL (PRIMARY) HYPERTENSION: ICD-10-CM

## 2024-10-29 DIAGNOSIS — E78.5 HYPERLIPIDEMIA, UNSPECIFIED: ICD-10-CM

## 2024-10-29 DIAGNOSIS — E11.9 TYPE 2 DIABETES MELLITUS W/OUT COMPLICATIONS: ICD-10-CM

## 2024-10-29 PROCEDURE — 99214 OFFICE O/P EST MOD 30 MIN: CPT | Mod: 25

## 2024-10-29 RX ORDER — DAPAGLIFLOZIN 10 MG/1
10 TABLET, FILM COATED ORAL
Qty: 90 | Refills: 0 | Status: DISCONTINUED | COMMUNITY
Start: 2024-10-29 | End: 2024-10-29

## 2024-11-05 LAB
ESTIMATED AVERAGE GLUCOSE: 183 MG/DL
HBA1C MFR BLD HPLC: 8 %

## 2024-11-05 RX ORDER — DULAGLUTIDE 4.5 MG/.5ML
4.5 INJECTION, SOLUTION SUBCUTANEOUS
Qty: 3 | Refills: 1 | Status: ACTIVE | COMMUNITY
Start: 2024-11-05 | End: 1900-01-01

## 2025-01-07 ENCOUNTER — RX RENEWAL (OUTPATIENT)
Age: 65
End: 2025-01-07

## 2025-01-18 ENCOUNTER — RX RENEWAL (OUTPATIENT)
Age: 65
End: 2025-01-18

## 2025-01-28 ENCOUNTER — APPOINTMENT (OUTPATIENT)
Dept: INTERNAL MEDICINE | Facility: CLINIC | Age: 65
End: 2025-01-28
Payer: COMMERCIAL

## 2025-01-28 VITALS
BODY MASS INDEX: 30.75 KG/M2 | OXYGEN SATURATION: 97 % | DIASTOLIC BLOOD PRESSURE: 80 MMHG | HEIGHT: 72 IN | SYSTOLIC BLOOD PRESSURE: 130 MMHG | WEIGHT: 227 LBS | HEART RATE: 68 BPM

## 2025-01-28 DIAGNOSIS — I48.91 UNSPECIFIED ATRIAL FIBRILLATION: ICD-10-CM

## 2025-01-28 DIAGNOSIS — I10 ESSENTIAL (PRIMARY) HYPERTENSION: ICD-10-CM

## 2025-01-28 DIAGNOSIS — E11.9 TYPE 2 DIABETES MELLITUS W/OUT COMPLICATIONS: ICD-10-CM

## 2025-01-28 DIAGNOSIS — E78.5 HYPERLIPIDEMIA, UNSPECIFIED: ICD-10-CM

## 2025-01-28 PROCEDURE — 99214 OFFICE O/P EST MOD 30 MIN: CPT | Mod: 25

## 2025-01-28 RX ORDER — RIVAROXABAN 20 MG/1
20 TABLET, FILM COATED ORAL
Qty: 90 | Refills: 3 | Status: ACTIVE | COMMUNITY
Start: 2025-01-28

## 2025-02-03 LAB
ALBUMIN SERPL ELPH-MCNC: 4.5 G/DL
ALP BLD-CCNC: 88 U/L
ALT SERPL-CCNC: 29 U/L
ANION GAP SERPL CALC-SCNC: 12 MMOL/L
AST SERPL-CCNC: 19 U/L
BASOPHILS # BLD AUTO: 0.04 K/UL
BASOPHILS NFR BLD AUTO: 0.5 %
BILIRUB SERPL-MCNC: 0.4 MG/DL
BUN SERPL-MCNC: 14 MG/DL
CALCIUM SERPL-MCNC: 9 MG/DL
CHLORIDE SERPL-SCNC: 105 MMOL/L
CHOLEST SERPL-MCNC: 140 MG/DL
CO2 SERPL-SCNC: 25 MMOL/L
CREAT SERPL-MCNC: 0.88 MG/DL
EGFR: 97 ML/MIN/1.73M2
EOSINOPHIL # BLD AUTO: 0.13 K/UL
EOSINOPHIL NFR BLD AUTO: 1.7 %
ESTIMATED AVERAGE GLUCOSE: 200 MG/DL
GLUCOSE SERPL-MCNC: 180 MG/DL
HBA1C MFR BLD HPLC: 8.6 %
HCT VFR BLD CALC: 46.8 %
HDLC SERPL-MCNC: 38 MG/DL
HGB BLD-MCNC: 15.8 G/DL
IMM GRANULOCYTES NFR BLD AUTO: 0.3 %
LDLC SERPL CALC-MCNC: 78 MG/DL
LYMPHOCYTES # BLD AUTO: 1.65 K/UL
LYMPHOCYTES NFR BLD AUTO: 21.6 %
MAN DIFF?: NORMAL
MCHC RBC-ENTMCNC: 31.4 PG
MCHC RBC-ENTMCNC: 33.8 G/DL
MCV RBC AUTO: 93 FL
MONOCYTES # BLD AUTO: 0.64 K/UL
MONOCYTES NFR BLD AUTO: 8.4 %
NEUTROPHILS # BLD AUTO: 5.15 K/UL
NEUTROPHILS NFR BLD AUTO: 67.5 %
NONHDLC SERPL-MCNC: 102 MG/DL
PLATELET # BLD AUTO: 296 K/UL
POTASSIUM SERPL-SCNC: 4 MMOL/L
PROT SERPL-MCNC: 6.4 G/DL
RBC # BLD: 5.03 M/UL
RBC # FLD: 13.8 %
SODIUM SERPL-SCNC: 141 MMOL/L
TRIGL SERPL-MCNC: 138 MG/DL
WBC # FLD AUTO: 7.63 K/UL

## 2025-04-24 ENCOUNTER — NON-APPOINTMENT (OUTPATIENT)
Age: 65
End: 2025-04-24

## 2025-04-24 ENCOUNTER — APPOINTMENT (OUTPATIENT)
Dept: ORTHOPEDIC SURGERY | Facility: CLINIC | Age: 65
End: 2025-04-24
Payer: COMMERCIAL

## 2025-04-24 DIAGNOSIS — M25.561 PAIN IN RIGHT KNEE: ICD-10-CM

## 2025-04-24 DIAGNOSIS — M25.511 PAIN IN RIGHT SHOULDER: ICD-10-CM

## 2025-04-24 PROCEDURE — 99214 OFFICE O/P EST MOD 30 MIN: CPT | Mod: 25

## 2025-04-24 PROCEDURE — 73564 X-RAY EXAM KNEE 4 OR MORE: CPT | Mod: RT

## 2025-04-25 ENCOUNTER — APPOINTMENT (OUTPATIENT)
Dept: ORTHOPEDIC SURGERY | Facility: CLINIC | Age: 65
End: 2025-04-25
Payer: COMMERCIAL

## 2025-04-25 PROCEDURE — 99213 OFFICE O/P EST LOW 20 MIN: CPT

## 2025-04-28 ENCOUNTER — APPOINTMENT (OUTPATIENT)
Dept: INTERNAL MEDICINE | Facility: CLINIC | Age: 65
End: 2025-04-28
Payer: COMMERCIAL

## 2025-04-28 VITALS
HEIGHT: 72 IN | SYSTOLIC BLOOD PRESSURE: 120 MMHG | BODY MASS INDEX: 30.2 KG/M2 | DIASTOLIC BLOOD PRESSURE: 80 MMHG | HEART RATE: 68 BPM | OXYGEN SATURATION: 97 % | WEIGHT: 223 LBS

## 2025-04-28 DIAGNOSIS — I10 ESSENTIAL (PRIMARY) HYPERTENSION: ICD-10-CM

## 2025-04-28 DIAGNOSIS — I48.91 UNSPECIFIED ATRIAL FIBRILLATION: ICD-10-CM

## 2025-04-28 DIAGNOSIS — E78.5 HYPERLIPIDEMIA, UNSPECIFIED: ICD-10-CM

## 2025-04-28 DIAGNOSIS — E11.9 TYPE 2 DIABETES MELLITUS W/OUT COMPLICATIONS: ICD-10-CM

## 2025-04-28 PROCEDURE — 99214 OFFICE O/P EST MOD 30 MIN: CPT | Mod: 25

## 2025-04-29 LAB
ALBUMIN SERPL ELPH-MCNC: 4.5 G/DL
ALP BLD-CCNC: 87 U/L
ALT SERPL-CCNC: 30 U/L
ANION GAP SERPL CALC-SCNC: 16 MMOL/L
AST SERPL-CCNC: 22 U/L
BASOPHILS # BLD AUTO: 0.06 K/UL
BASOPHILS NFR BLD AUTO: 0.7 %
BILIRUB SERPL-MCNC: 0.5 MG/DL
BUN SERPL-MCNC: 14 MG/DL
CALCIUM SERPL-MCNC: 9.3 MG/DL
CHLORIDE SERPL-SCNC: 105 MMOL/L
CHOLEST SERPL-MCNC: 149 MG/DL
CO2 SERPL-SCNC: 21 MMOL/L
CREAT SERPL-MCNC: 0.76 MG/DL
CREAT SPEC-SCNC: 89 MG/DL
EGFRCR SERPLBLD CKD-EPI 2021: 101 ML/MIN/1.73M2
EOSINOPHIL # BLD AUTO: 0.18 K/UL
EOSINOPHIL NFR BLD AUTO: 2.1 %
ESTIMATED AVERAGE GLUCOSE: 171 MG/DL
GLUCOSE SERPL-MCNC: 162 MG/DL
HBA1C MFR BLD HPLC: 7.6 %
HCT VFR BLD CALC: 50 %
HDLC SERPL-MCNC: 42 MG/DL
HGB BLD-MCNC: 16.3 G/DL
IMM GRANULOCYTES NFR BLD AUTO: 0.1 %
LDLC SERPL-MCNC: 89 MG/DL
LYMPHOCYTES # BLD AUTO: 1.69 K/UL
LYMPHOCYTES NFR BLD AUTO: 20 %
MAN DIFF?: NORMAL
MCHC RBC-ENTMCNC: 30.7 PG
MCHC RBC-ENTMCNC: 32.6 G/DL
MCV RBC AUTO: 94.2 FL
MICROALBUMIN 24H UR DL<=1MG/L-MCNC: 1.4 MG/DL
MICROALBUMIN/CREAT 24H UR-RTO: 16 MG/G
MONOCYTES # BLD AUTO: 0.68 K/UL
MONOCYTES NFR BLD AUTO: 8 %
NEUTROPHILS # BLD AUTO: 5.83 K/UL
NEUTROPHILS NFR BLD AUTO: 69.1 %
NONHDLC SERPL-MCNC: 107 MG/DL
PLATELET # BLD AUTO: 329 K/UL
POTASSIUM SERPL-SCNC: 4.3 MMOL/L
PROT SERPL-MCNC: 6.8 G/DL
RBC # BLD: 5.31 M/UL
RBC # FLD: 13.8 %
SODIUM SERPL-SCNC: 142 MMOL/L
TRIGL SERPL-MCNC: 96 MG/DL
TSH SERPL-ACNC: 1.24 UIU/ML
WBC # FLD AUTO: 8.45 K/UL

## 2025-04-30 DIAGNOSIS — Z91.09 OTHER ALLERGY STATUS, OTHER THAN TO DRUGS AND BIOLOGICAL SUBSTANCES: ICD-10-CM

## 2025-04-30 RX ORDER — LORATADINE 10 MG/1
10 TABLET ORAL
Qty: 90 | Refills: 1 | Status: ACTIVE | COMMUNITY
Start: 2025-04-30 | End: 1900-01-01

## 2025-05-02 ENCOUNTER — APPOINTMENT (OUTPATIENT)
Dept: ORTHOPEDIC SURGERY | Facility: CLINIC | Age: 65
End: 2025-05-02
Payer: COMMERCIAL

## 2025-05-02 DIAGNOSIS — M23.91 UNSPECIFIED INTERNAL DERANGEMENT OF RIGHT KNEE: ICD-10-CM

## 2025-05-02 PROCEDURE — 99214 OFFICE O/P EST MOD 30 MIN: CPT

## 2025-05-13 ENCOUNTER — APPOINTMENT (OUTPATIENT)
Dept: ORTHOPEDIC SURGERY | Facility: CLINIC | Age: 65
End: 2025-05-13
Payer: COMMERCIAL

## 2025-05-13 DIAGNOSIS — S83.206A UNSPECIFIED TEAR OF UNSPECIFIED MENISCUS, CURRENT INJURY, RIGHT KNEE, INITIAL ENCOUNTER: ICD-10-CM

## 2025-05-13 PROCEDURE — 99214 OFFICE O/P EST MOD 30 MIN: CPT

## 2025-05-16 ENCOUNTER — NON-APPOINTMENT (OUTPATIENT)
Age: 65
End: 2025-05-16

## 2025-05-23 ENCOUNTER — OUTPATIENT (OUTPATIENT)
Dept: OUTPATIENT SERVICES | Facility: HOSPITAL | Age: 65
LOS: 1 days | End: 2025-05-23
Payer: COMMERCIAL

## 2025-05-23 VITALS
DIASTOLIC BLOOD PRESSURE: 82 MMHG | RESPIRATION RATE: 17 BRPM | SYSTOLIC BLOOD PRESSURE: 141 MMHG | OXYGEN SATURATION: 100 % | WEIGHT: 222.67 LBS | HEIGHT: 72 IN | HEART RATE: 66 BPM | TEMPERATURE: 97 F

## 2025-05-23 DIAGNOSIS — S61.012A LACERATION WITHOUT FOREIGN BODY OF LEFT THUMB WITHOUT DAMAGE TO NAIL, INITIAL ENCOUNTER: Chronic | ICD-10-CM

## 2025-05-23 DIAGNOSIS — Z01.818 ENCOUNTER FOR OTHER PREPROCEDURAL EXAMINATION: ICD-10-CM

## 2025-05-23 DIAGNOSIS — Z98.890 OTHER SPECIFIED POSTPROCEDURAL STATES: Chronic | ICD-10-CM

## 2025-05-23 DIAGNOSIS — Z98.89 OTHER SPECIFIED POSTPROCEDURAL STATES: Chronic | ICD-10-CM

## 2025-05-23 LAB
ANION GAP SERPL CALC-SCNC: 6 MMOL/L — SIGNIFICANT CHANGE UP (ref 5–17)
APTT BLD: 33.2 SEC — SIGNIFICANT CHANGE UP (ref 26.1–36.8)
BASOPHILS # BLD AUTO: 0.05 K/UL — SIGNIFICANT CHANGE UP (ref 0–0.2)
BASOPHILS NFR BLD AUTO: 0.6 % — SIGNIFICANT CHANGE UP (ref 0–2)
BUN SERPL-MCNC: 11 MG/DL — SIGNIFICANT CHANGE UP (ref 7–23)
CALCIUM SERPL-MCNC: 9.6 MG/DL — SIGNIFICANT CHANGE UP (ref 8.5–10.1)
CHLORIDE SERPL-SCNC: 107 MMOL/L — SIGNIFICANT CHANGE UP (ref 96–108)
CO2 SERPL-SCNC: 27 MMOL/L — SIGNIFICANT CHANGE UP (ref 22–31)
CREAT SERPL-MCNC: 0.81 MG/DL — SIGNIFICANT CHANGE UP (ref 0.5–1.3)
EGFR: 99 ML/MIN/1.73M2 — SIGNIFICANT CHANGE UP
EGFR: 99 ML/MIN/1.73M2 — SIGNIFICANT CHANGE UP
EOSINOPHIL # BLD AUTO: 0.17 K/UL — SIGNIFICANT CHANGE UP (ref 0–0.5)
EOSINOPHIL NFR BLD AUTO: 1.9 % — SIGNIFICANT CHANGE UP (ref 0–6)
GLUCOSE SERPL-MCNC: 159 MG/DL — HIGH (ref 70–99)
HCT VFR BLD CALC: 47 % — SIGNIFICANT CHANGE UP (ref 39–50)
HGB BLD-MCNC: 16.3 G/DL — SIGNIFICANT CHANGE UP (ref 13–17)
IMM GRANULOCYTES # BLD AUTO: 0.03 K/UL — SIGNIFICANT CHANGE UP (ref 0–0.07)
IMM GRANULOCYTES NFR BLD AUTO: 0.3 % — SIGNIFICANT CHANGE UP (ref 0–0.9)
INR BLD: 0.94 RATIO — SIGNIFICANT CHANGE UP (ref 0.85–1.16)
LYMPHOCYTES # BLD AUTO: 1.57 K/UL — SIGNIFICANT CHANGE UP (ref 1–3.3)
LYMPHOCYTES NFR BLD AUTO: 17.6 % — SIGNIFICANT CHANGE UP (ref 13–44)
MCHC RBC-ENTMCNC: 30.9 PG — SIGNIFICANT CHANGE UP (ref 27–34)
MCHC RBC-ENTMCNC: 34.7 G/DL — SIGNIFICANT CHANGE UP (ref 32–36)
MCV RBC AUTO: 89.2 FL — SIGNIFICANT CHANGE UP (ref 80–100)
MONOCYTES # BLD AUTO: 0.8 K/UL — SIGNIFICANT CHANGE UP (ref 0–0.9)
MONOCYTES NFR BLD AUTO: 8.9 % — SIGNIFICANT CHANGE UP (ref 2–14)
NEUTROPHILS # BLD AUTO: 6.32 K/UL — SIGNIFICANT CHANGE UP (ref 1.8–7.4)
NEUTROPHILS NFR BLD AUTO: 70.7 % — SIGNIFICANT CHANGE UP (ref 43–77)
NRBC # BLD AUTO: 0 K/UL — SIGNIFICANT CHANGE UP (ref 0–0)
NRBC # FLD: 0 K/UL — SIGNIFICANT CHANGE UP (ref 0–0)
NRBC BLD AUTO-RTO: 0 /100 WBCS — SIGNIFICANT CHANGE UP (ref 0–0)
PLATELET # BLD AUTO: 321 K/UL — SIGNIFICANT CHANGE UP (ref 150–400)
PMV BLD: 10 FL — SIGNIFICANT CHANGE UP (ref 7–13)
POTASSIUM SERPL-MCNC: 3.8 MMOL/L — SIGNIFICANT CHANGE UP (ref 3.5–5.3)
POTASSIUM SERPL-SCNC: 3.8 MMOL/L — SIGNIFICANT CHANGE UP (ref 3.5–5.3)
PROTHROM AB SERPL-ACNC: 10.8 SEC — SIGNIFICANT CHANGE UP (ref 9.9–13.4)
RBC # BLD: 5.27 M/UL — SIGNIFICANT CHANGE UP (ref 4.2–5.8)
RBC # FLD: 12.7 % — SIGNIFICANT CHANGE UP (ref 10.3–14.5)
SODIUM SERPL-SCNC: 140 MMOL/L — SIGNIFICANT CHANGE UP (ref 135–145)
WBC # BLD: 8.94 K/UL — SIGNIFICANT CHANGE UP (ref 3.8–10.5)
WBC # FLD AUTO: 8.94 K/UL — SIGNIFICANT CHANGE UP (ref 3.8–10.5)

## 2025-05-23 PROCEDURE — 85730 THROMBOPLASTIN TIME PARTIAL: CPT

## 2025-05-23 PROCEDURE — 36415 COLL VENOUS BLD VENIPUNCTURE: CPT

## 2025-05-23 PROCEDURE — 99214 OFFICE O/P EST MOD 30 MIN: CPT | Mod: 25

## 2025-05-23 PROCEDURE — 85025 COMPLETE CBC W/AUTO DIFF WBC: CPT

## 2025-05-23 PROCEDURE — 80048 BASIC METABOLIC PNL TOTAL CA: CPT

## 2025-05-23 PROCEDURE — 85610 PROTHROMBIN TIME: CPT

## 2025-05-23 NOTE — H&P PST ADULT - NSICDXPASTMEDICALHX_GEN_ALL_CORE_FT
PAST MEDICAL HISTORY:  2019 novel coronavirus disease (COVID-19) 1/2021  had covid, no intubation, no oxygen, no hospitalization, home quarantine    Achilles tendon tear     H/O melanoma excision     H/O neuropathy     H/O tear of anterior cruciate ligament     Hiatal hernia     History of atrial fibrillation     History of elevated PSA     History of umbilical hernia     HTN (hypertension)     Hyperlipidemia     Tear of meniscus, medial     Type II diabetes mellitus

## 2025-05-23 NOTE — H&P PST ADULT - HISTORY OF PRESENT ILLNESS
65 year old male who presents to PST with chief complaint of right knee pain. Patient explains how he  65 year old male who presents to PST with chief complaint of right knee pain. Patient explains how he injured his approximately 1 month ago when a ladder fell and his the lateral portion of his knee. He was evaluated by Dr. Oglesby - MRI performed showing a tear to both medial and lateral meniscus tear. Today he endorses 3/10 pain that is aching in nature. Pain is worse with certain movements and relieved with resting. He has been going to PT with minimal relief noted. Surgical intervention was recommended and he is scheduled to have a right knee arthroscopy with meniscus root repair versus partial meniscectomy.

## 2025-05-23 NOTE — H&P PST ADULT - NSICDXFAMILYHX_GEN_ALL_CORE_FT
FAMILY HISTORY:  Father  Still living? Yes, Estimated age: 81-90  Family history of diabetes mellitus, Age at diagnosis: Age Unknown  Family history of heart disease, Age at diagnosis: Age Unknown    Mother  Still living? Yes, Estimated age: Age Unknown  FH: colon cancer, Age at diagnosis: Age Unknown

## 2025-05-23 NOTE — H&P PST ADULT - MUSCULOSKELETAL
details… no calf tenderness/decreased ROM due to pain no joint swelling/no calf tenderness/decreased ROM due to pain/strength 5/5 bilateral lower extremities

## 2025-05-23 NOTE — H&P PST ADULT - ASSESSMENT
65 year old male who is scheduled to have a right knee arthroscopy with meniscus root repair vesus partial meniscectomy.       Plan:  1. PST instructions given ; NPO status/ instructions to be given by ASU   2. Pt instructed to take following meds on day of surgery: none  3. Pt instructed to take routine evening medications unless indicated   4. Stop NSAIDS ( Aspirin, Aleve, Motrin, Mobic, Diclofenac), herbal supplements, MVI, Vitamins, fish oil 7 days prior to surgery unless directed by surgeon or cardiologist;   5. Medical Optimization with Dr. Lim, cardiac optimization Dr. Rincon   6. EZ wash instructions given  7. Labs, EKG as per surgeon request    65 year old male who is scheduled to have a right knee arthroscopy with meniscus root repair versus partial meniscectomy.       Plan:  1. PST instructions given ; NPO status/ instructions to be given by ASU   2. Pt instructed to take following meds on day of surgery: none  *Pt was instructed to hold valsartan morning of surgery.   3. Pt instructed to take routine evening medications unless indicated   4. Stop NSAIDS ( Aspirin, Aleve, Motrin, Mobic, Diclofenac), herbal supplements, MVI, Vitamins, fish oil 7 days prior to surgery unless directed by surgeon or cardiologist;   5. Medical Optimization with Dr. Lim, cardiac optimization Dr. Rincon   6. Patient is allergic to CHG - instructed to shower with antibacterial soap  7. Labs, EKG as per surgeon request

## 2025-05-24 DIAGNOSIS — Z01.818 ENCOUNTER FOR OTHER PREPROCEDURAL EXAMINATION: ICD-10-CM

## 2025-05-29 ENCOUNTER — APPOINTMENT (OUTPATIENT)
Dept: INTERNAL MEDICINE | Facility: CLINIC | Age: 65
End: 2025-05-29
Payer: COMMERCIAL

## 2025-05-29 VITALS
BODY MASS INDEX: 29.8 KG/M2 | HEIGHT: 72 IN | WEIGHT: 220 LBS | OXYGEN SATURATION: 98 % | SYSTOLIC BLOOD PRESSURE: 142 MMHG | HEART RATE: 72 BPM | DIASTOLIC BLOOD PRESSURE: 68 MMHG

## 2025-05-29 DIAGNOSIS — M54.50 LOW BACK PAIN, UNSPECIFIED: ICD-10-CM

## 2025-05-29 DIAGNOSIS — E11.9 TYPE 2 DIABETES MELLITUS W/OUT COMPLICATIONS: ICD-10-CM

## 2025-05-29 DIAGNOSIS — E78.5 HYPERLIPIDEMIA, UNSPECIFIED: ICD-10-CM

## 2025-05-29 DIAGNOSIS — M23.91 UNSPECIFIED INTERNAL DERANGEMENT OF RIGHT KNEE: ICD-10-CM

## 2025-05-29 DIAGNOSIS — S83.206A UNSPECIFIED TEAR OF UNSPECIFIED MENISCUS, CURRENT INJURY, RIGHT KNEE, INITIAL ENCOUNTER: ICD-10-CM

## 2025-05-29 DIAGNOSIS — I48.91 UNSPECIFIED ATRIAL FIBRILLATION: ICD-10-CM

## 2025-05-29 DIAGNOSIS — I10 ESSENTIAL (PRIMARY) HYPERTENSION: ICD-10-CM

## 2025-05-29 PROCEDURE — G2211 COMPLEX E/M VISIT ADD ON: CPT | Mod: NC

## 2025-05-29 PROCEDURE — 99214 OFFICE O/P EST MOD 30 MIN: CPT

## 2025-06-03 PROBLEM — S83.249A OTHER TEAR OF MEDIAL MENISCUS, CURRENT INJURY, UNSPECIFIED KNEE, INITIAL ENCOUNTER: Chronic | Status: ACTIVE | Noted: 2025-05-23

## 2025-06-04 ENCOUNTER — OUTPATIENT (OUTPATIENT)
Dept: INPATIENT UNIT | Facility: HOSPITAL | Age: 65
LOS: 1 days | Discharge: HOME | End: 2025-06-04
Payer: COMMERCIAL

## 2025-06-04 ENCOUNTER — APPOINTMENT (OUTPATIENT)
Dept: ORTHOPEDIC SURGERY | Facility: HOSPITAL | Age: 65
End: 2025-06-04

## 2025-06-04 VITALS
HEIGHT: 72 IN | DIASTOLIC BLOOD PRESSURE: 86 MMHG | RESPIRATION RATE: 16 BRPM | OXYGEN SATURATION: 96 % | TEMPERATURE: 98 F | SYSTOLIC BLOOD PRESSURE: 150 MMHG | WEIGHT: 223.11 LBS | HEART RATE: 67 BPM

## 2025-06-04 VITALS
SYSTOLIC BLOOD PRESSURE: 140 MMHG | RESPIRATION RATE: 16 BRPM | TEMPERATURE: 98 F | HEART RATE: 66 BPM | DIASTOLIC BLOOD PRESSURE: 77 MMHG

## 2025-06-04 DIAGNOSIS — Z98.890 OTHER SPECIFIED POSTPROCEDURAL STATES: Chronic | ICD-10-CM

## 2025-06-04 DIAGNOSIS — S83.206A UNSPECIFIED TEAR OF UNSPECIFIED MENISCUS, CURRENT INJURY, RIGHT KNEE, INITIAL ENCOUNTER: ICD-10-CM

## 2025-06-04 DIAGNOSIS — Z98.89 OTHER SPECIFIED POSTPROCEDURAL STATES: Chronic | ICD-10-CM

## 2025-06-04 DIAGNOSIS — S61.012A LACERATION WITHOUT FOREIGN BODY OF LEFT THUMB WITHOUT DAMAGE TO NAIL, INITIAL ENCOUNTER: Chronic | ICD-10-CM

## 2025-06-04 PROCEDURE — C9399: CPT

## 2025-06-04 PROCEDURE — 29881 ARTHRS KNE SRG MNISECTMY M/L: CPT | Mod: RT

## 2025-06-04 RX ORDER — SENNOSIDES, DOCUSATE SODIUM 8.6; 5 MG/1; MG/1
2 TABLET ORAL
Qty: 20 | Refills: 0
Start: 2025-06-04 | End: 2025-06-08

## 2025-06-04 RX ORDER — SODIUM CHLORIDE 9 G/1000ML
1000 INJECTION, SOLUTION INTRAVENOUS
Refills: 0 | Status: DISCONTINUED | OUTPATIENT
Start: 2025-06-04 | End: 2025-06-04

## 2025-06-04 RX ORDER — NALOXONE HYDROCHLORIDE 0.4 MG/ML
1 INJECTION, SOLUTION INTRAMUSCULAR; INTRAVENOUS; SUBCUTANEOUS
Qty: 1 | Refills: 0
Start: 2025-06-04

## 2025-06-04 RX ORDER — OXYCODONE HYDROCHLORIDE 30 MG/1
1 TABLET ORAL
Qty: 30 | Refills: 0
Start: 2025-06-04 | End: 2025-06-08

## 2025-06-04 RX ORDER — ASPIRIN 325 MG
1 TABLET ORAL
Qty: 30 | Refills: 0
Start: 2025-06-04 | End: 2025-07-03

## 2025-06-04 RX ORDER — ONDANSETRON HCL/PF 4 MG/2 ML
1 VIAL (ML) INJECTION
Qty: 30 | Refills: 0
Start: 2025-06-04 | End: 2025-06-08

## 2025-06-04 RX ORDER — CYANOCOBALAMIN 1000 UG/ML
1 INJECTION INTRAMUSCULAR; SUBCUTANEOUS
Refills: 0 | DISCHARGE

## 2025-06-04 RX ORDER — HYDROMORPHONE/SOD CHLOR,ISO/PF 2 MG/10 ML
1 SYRINGE (ML) INJECTION
Refills: 0 | Status: DISCONTINUED | OUTPATIENT
Start: 2025-06-04 | End: 2025-06-04

## 2025-06-04 RX ORDER — HYDROMORPHONE/SOD CHLOR,ISO/PF 2 MG/10 ML
0.5 SYRINGE (ML) INJECTION
Refills: 0 | Status: DISCONTINUED | OUTPATIENT
Start: 2025-06-04 | End: 2025-06-04

## 2025-06-04 NOTE — PROCEDURAL SAFETY CHECKLIST WITH OR WITHOUT SEDATION - NSPX2BRECORDED_GEN_ALL_CORE
"Punch Biopsy Wound Care    Your doctor has performed a punch biopsy today.  A band aid and antibiotic ointment has been placed over the site.  This should remain in place for 24 hours.  It is recommended that you keep the area dry for the first 24 hours.  After 24 hours, you may remove the band aid and wash the area with warm soap and water and apply Vaseline jelly.  Many patients prefer to use Neosporin or Bacitracin ointment.  This is acceptable; however know that you can develop an allergy to this medication even if you have used it safely for years.  It is important to keep the area moist.  Letting it dry out and get air slows healing time, will worsen the scar, and make it more difficult to remove the stitches if they were placed.  Band aid is optional after first 24 hours.      If you notice increasing redness, tenderness, pain, or yellow drainage at the biopsy or surgical site, please notify your doctor.  These are signs of an infection.    If your biopsy/surgical site is bleeding, apply firm pressure for 15 minutes straight.  Repeat for another 15 minutes, if it is still bleeding.   If the surgical site continues to bleed, then please contact your doctor.      For [facility] users:   You will receive a [facility] notification after the pathologist has finished reviewing your biopsy specimen. Pathology results, however, will not be released online so you will see a "no content" message. Once your dermatologist reviews and clinically correlates your biopsy results, you will either receive a letter in the mail with the results of a phone call from your doctor's office if further explanation or treatment is warranted.       [address] [place] [address]/ [phone] FAX/ [email or web address]         "
Right Adductor canal nerve block

## 2025-06-04 NOTE — ASU DISCHARGE PLAN (ADULT/PEDIATRIC) - CARE PROVIDER_API CALL
Alex Oglesby  Orthopaedic Sports Medicine  [address], [address]  [place] [address]  Phone: [phone]  Fax: [phone]  Follow Up Time:

## 2025-06-04 NOTE — ASU DISCHARGE PLAN (ADULT/PEDIATRIC) - ASU DC SPECIAL INSTRUCTIONSFT
Please follow the instruction packet provided by . All medications have been sent to your pharmacy. Please  on your way home. Weight bearing as tolerated

## 2025-06-04 NOTE — ASU PATIENT PROFILE, ADULT - NSICDXPASTSURGICALHX_GEN_ALL_CORE_FT
PAST SURGICAL HISTORY:  H/O cardiac radiofrequency ablation     History of colonoscopy     History of left knee surgery ACL repair    History of repair of anterior cruciate ligament of right knee     History of umbilical hernia repair     Laceration of left thumb with tendon involvement s/p surgical repair

## 2025-06-04 NOTE — ASU PATIENT PROFILE, ADULT - BILL OF RIGHTS/ADMISSION INFORMATION PROVIDED TO:
If he has been taking metoprolol  mg twice daily for some time and tolerating medication okay to    Okay to continue doing so    Please send updated Rx for metoprolol  mg tablets twice daily and notify patient of latest change    Thanks!  Kaz Jolley MD, MS, FACC     Patient

## 2025-06-04 NOTE — ASU PREOP CHECKLIST - SKIN PREP
How Severe Is Your Rash?: moderate Is This A New Presentation, Or A Follow-Up?: Rash allergic to chg/n/a

## 2025-06-04 NOTE — ASU DISCHARGE PLAN (ADULT/PEDIATRIC) - FINANCIAL ASSISTANCE
[facility] provides services at a reduced cost to those who are determined to be eligible through [facility]’s financial assistance program. Information regarding [facility]’s financial assistance program can be found by going to [email or web address] or by calling [phone].

## 2025-06-10 DIAGNOSIS — S83.241A OTHER TEAR OF MEDIAL MENISCUS, CURRENT INJURY, RIGHT KNEE, INITIAL ENCOUNTER: ICD-10-CM
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
## 2025-06-10 DIAGNOSIS — M94.261 CHONDROMALACIA, RIGHT KNEE: ICD-10-CM

## 2025-06-16 ENCOUNTER — NON-APPOINTMENT (OUTPATIENT)
Age: 65
End: 2025-06-16

## 2025-06-17 ENCOUNTER — APPOINTMENT (OUTPATIENT)
Dept: ORTHOPEDIC SURGERY | Facility: CLINIC | Age: 65
End: 2025-06-17
Payer: COMMERCIAL

## 2025-06-17 PROCEDURE — 73560 X-RAY EXAM OF KNEE 1 OR 2: CPT | Mod: RT

## 2025-06-17 PROCEDURE — 99024 POSTOP FOLLOW-UP VISIT: CPT

## 2025-07-01 ENCOUNTER — RX RENEWAL (OUTPATIENT)
Age: 65
End: 2025-07-01

## 2025-07-09 ENCOUNTER — NON-APPOINTMENT (OUTPATIENT)
Age: 65
End: 2025-07-09

## 2025-07-16 ENCOUNTER — RX RENEWAL (OUTPATIENT)
Age: 65
End: 2025-07-16

## 2025-07-17 ENCOUNTER — APPOINTMENT (OUTPATIENT)
Dept: ORTHOPEDIC SURGERY | Facility: CLINIC | Age: 65
End: 2025-07-17
Payer: COMMERCIAL

## 2025-07-17 PROBLEM — S83.242D ACUTE MEDIAL MENISCAL TEAR, LEFT, SUBSEQUENT ENCOUNTER: Status: ACTIVE | Noted: 2025-06-17

## 2025-07-17 PROCEDURE — 99024 POSTOP FOLLOW-UP VISIT: CPT

## 2025-07-31 ENCOUNTER — APPOINTMENT (OUTPATIENT)
Dept: INTERNAL MEDICINE | Facility: CLINIC | Age: 65
End: 2025-07-31
Payer: COMMERCIAL

## 2025-07-31 VITALS
WEIGHT: 218 LBS | BODY MASS INDEX: 29.57 KG/M2 | DIASTOLIC BLOOD PRESSURE: 88 MMHG | SYSTOLIC BLOOD PRESSURE: 146 MMHG | HEART RATE: 74 BPM

## 2025-07-31 DIAGNOSIS — E78.5 HYPERLIPIDEMIA, UNSPECIFIED: ICD-10-CM

## 2025-07-31 DIAGNOSIS — E11.40 TYPE 2 DIABETES MELLITUS WITH DIABETIC NEUROPATHY, UNSPECIFIED: ICD-10-CM

## 2025-07-31 DIAGNOSIS — I10 ESSENTIAL (PRIMARY) HYPERTENSION: ICD-10-CM

## 2025-07-31 DIAGNOSIS — E11.9 TYPE 2 DIABETES MELLITUS W/OUT COMPLICATIONS: ICD-10-CM

## 2025-07-31 DIAGNOSIS — I48.91 UNSPECIFIED ATRIAL FIBRILLATION: ICD-10-CM

## 2025-07-31 PROCEDURE — 99214 OFFICE O/P EST MOD 30 MIN: CPT | Mod: 25

## 2025-08-01 LAB
ALBUMIN SERPL ELPH-MCNC: 4.8 G/DL
ALP BLD-CCNC: 98 U/L
ALT SERPL-CCNC: 26 U/L
ANION GAP SERPL CALC-SCNC: 17 MMOL/L
AST SERPL-CCNC: 20 U/L
BASOPHILS # BLD AUTO: 0.06 K/UL
BASOPHILS NFR BLD AUTO: 0.7 %
BILIRUB SERPL-MCNC: 0.5 MG/DL
BUN SERPL-MCNC: 17 MG/DL
CALCIUM SERPL-MCNC: 10.3 MG/DL
CHLORIDE SERPL-SCNC: 104 MMOL/L
CHOLEST SERPL-MCNC: 143 MG/DL
CO2 SERPL-SCNC: 21 MMOL/L
CREAT SERPL-MCNC: 0.77 MG/DL
EGFRCR SERPLBLD CKD-EPI 2021: 101 ML/MIN/1.73M2
EOSINOPHIL # BLD AUTO: 0.21 K/UL
EOSINOPHIL NFR BLD AUTO: 2.5 %
ESTIMATED AVERAGE GLUCOSE: 157 MG/DL
GLUCOSE SERPL-MCNC: 174 MG/DL
HBA1C MFR BLD HPLC: 7.1 %
HCT VFR BLD CALC: 48.8 %
HDLC SERPL-MCNC: 44 MG/DL
HGB BLD-MCNC: 16.3 G/DL
IMM GRANULOCYTES NFR BLD AUTO: 0.1 %
LDLC SERPL-MCNC: 78 MG/DL
LYMPHOCYTES # BLD AUTO: 1.63 K/UL
LYMPHOCYTES NFR BLD AUTO: 19 %
MAN DIFF?: NORMAL
MCHC RBC-ENTMCNC: 30.6 PG
MCHC RBC-ENTMCNC: 33.4 G/DL
MCV RBC AUTO: 91.7 FL
MONOCYTES # BLD AUTO: 0.74 K/UL
MONOCYTES NFR BLD AUTO: 8.6 %
NEUTROPHILS # BLD AUTO: 5.91 K/UL
NEUTROPHILS NFR BLD AUTO: 69.1 %
NONHDLC SERPL-MCNC: 99 MG/DL
PLATELET # BLD AUTO: 340 K/UL
POTASSIUM SERPL-SCNC: 4.4 MMOL/L
PROT SERPL-MCNC: 7.1 G/DL
RBC # BLD: 5.32 M/UL
RBC # FLD: 13.6 %
SODIUM SERPL-SCNC: 142 MMOL/L
TRIGL SERPL-MCNC: 116 MG/DL
TSH SERPL-ACNC: 2.13 UIU/ML
WBC # FLD AUTO: 8.56 K/UL

## 2025-08-05 ENCOUNTER — APPOINTMENT (OUTPATIENT)
Dept: NEUROLOGY | Facility: CLINIC | Age: 65
End: 2025-08-05

## 2025-08-05 VITALS
HEART RATE: 69 BPM | BODY MASS INDEX: 29.53 KG/M2 | DIASTOLIC BLOOD PRESSURE: 68 MMHG | OXYGEN SATURATION: 97 % | WEIGHT: 218 LBS | SYSTOLIC BLOOD PRESSURE: 152 MMHG | HEIGHT: 72 IN

## 2025-08-05 DIAGNOSIS — G25.0 ESSENTIAL TREMOR: ICD-10-CM

## 2025-08-05 PROCEDURE — 99204 OFFICE O/P NEW MOD 45 MIN: CPT

## 2025-08-05 RX ORDER — PROPRANOLOL HYDROCHLORIDE 20 MG/1
20 TABLET ORAL
Qty: 120 | Refills: 2 | Status: ACTIVE | COMMUNITY
Start: 2025-08-05 | End: 1900-01-01

## 2025-08-17 PROBLEM — G25.0 BENIGN FAMILIAL TREMOR: Status: ACTIVE | Noted: 2025-08-05

## 2025-08-18 ENCOUNTER — NON-APPOINTMENT (OUTPATIENT)
Age: 65
End: 2025-08-18

## 2025-09-18 ENCOUNTER — APPOINTMENT (OUTPATIENT)
Dept: INTERNAL MEDICINE | Facility: CLINIC | Age: 65
End: 2025-09-18
Payer: COMMERCIAL

## 2025-09-18 VITALS — DIASTOLIC BLOOD PRESSURE: 82 MMHG | BODY MASS INDEX: 29.57 KG/M2 | SYSTOLIC BLOOD PRESSURE: 133 MMHG | WEIGHT: 218 LBS

## 2025-09-18 DIAGNOSIS — M54.6 LOW BACK PAIN, UNSPECIFIED: ICD-10-CM

## 2025-09-18 DIAGNOSIS — M54.50 LOW BACK PAIN, UNSPECIFIED: ICD-10-CM

## 2025-09-18 PROCEDURE — 99214 OFFICE O/P EST MOD 30 MIN: CPT

## 2025-09-18 PROCEDURE — G2211 COMPLEX E/M VISIT ADD ON: CPT | Mod: NC

## 2025-09-18 RX ORDER — CYCLOBENZAPRINE HYDROCHLORIDE 5 MG/1
5 TABLET, FILM COATED ORAL
Qty: 14 | Refills: 0 | Status: ACTIVE | COMMUNITY
Start: 2025-09-18 | End: 1900-01-01

## 2025-09-18 RX ORDER — METHYLPREDNISOLONE 4 MG/1
4 TABLET ORAL
Qty: 1 | Refills: 0 | Status: ACTIVE | COMMUNITY
Start: 2025-09-18 | End: 1900-01-01

## 2025-09-26 PROBLEM — M47.814 THORACIC SPONDYLOSIS: Status: ACTIVE | Noted: 2025-09-26

## 2025-09-26 PROBLEM — M47.816 LUMBAR SPONDYLOSIS: Status: ACTIVE | Noted: 2025-09-26

## (undated) DEVICE — FORCEP RADIAL JAW 4 JUMBO W NDL 2.8MM 3.2MM 240CM ORANGE DISP

## (undated) DEVICE — POLY TRAP ETRAP

## (undated) DEVICE — Device

## (undated) DEVICE — COMP INJECTION ELEVIEW AMPOULES 10ML

## (undated) DEVICE — STERIS DEFENDO 3-PIECE KIT (AIR/WATER, SUCTION & BIOPSY VALVES)

## (undated) DEVICE — SNARE CAPTIVATOR RND COLD STIFF 2.4X10MM 240CM

## (undated) DEVICE — VALVE ENDO SURESEAL II 0-5FR